# Patient Record
Sex: MALE | Race: WHITE | NOT HISPANIC OR LATINO
[De-identification: names, ages, dates, MRNs, and addresses within clinical notes are randomized per-mention and may not be internally consistent; named-entity substitution may affect disease eponyms.]

---

## 2017-01-10 ENCOUNTER — APPOINTMENT (OUTPATIENT)
Dept: SPINE | Facility: CLINIC | Age: 57
End: 2017-01-10

## 2017-01-18 ENCOUNTER — APPOINTMENT (OUTPATIENT)
Dept: SPINE | Facility: CLINIC | Age: 57
End: 2017-01-18

## 2017-01-18 VITALS
BODY MASS INDEX: 24.34 KG/M2 | DIASTOLIC BLOOD PRESSURE: 70 MMHG | HEIGHT: 70 IN | SYSTOLIC BLOOD PRESSURE: 120 MMHG | WEIGHT: 170 LBS

## 2017-01-18 DIAGNOSIS — M62.838 OTHER MUSCLE SPASM: ICD-10-CM

## 2017-01-18 DIAGNOSIS — M54.2 CERVICALGIA: ICD-10-CM

## 2017-01-18 DIAGNOSIS — M48.02 SPINAL STENOSIS, CERVICAL REGION: ICD-10-CM

## 2017-01-18 RX ORDER — METHOCARBAMOL 750 MG/1
750 TABLET, FILM COATED ORAL 3 TIMES DAILY
Qty: 60 | Refills: 0 | Status: ACTIVE | COMMUNITY
Start: 2017-01-18 | End: 1900-01-01

## 2017-02-01 ENCOUNTER — APPOINTMENT (OUTPATIENT)
Dept: SPINE | Facility: CLINIC | Age: 57
End: 2017-02-01

## 2017-02-01 VITALS — WEIGHT: 170 LBS | HEIGHT: 70 IN | BODY MASS INDEX: 24.34 KG/M2

## 2017-02-01 VITALS — SYSTOLIC BLOOD PRESSURE: 116 MMHG | DIASTOLIC BLOOD PRESSURE: 74 MMHG

## 2017-02-01 DIAGNOSIS — M48.00 SPINAL STENOSIS, SITE UNSPECIFIED: ICD-10-CM

## 2017-02-01 DIAGNOSIS — G95.89 OTHER SPECIFIED DISEASES OF SPINAL CORD: ICD-10-CM

## 2017-02-01 RX ORDER — OXYCODONE 10 MG/1
10 TABLET ORAL EVERY 8 HOURS
Qty: 90 | Refills: 0 | Status: ACTIVE | COMMUNITY
Start: 2017-01-18 | End: 1900-01-01

## 2021-03-12 ENCOUNTER — INPATIENT (INPATIENT)
Facility: HOSPITAL | Age: 61
LOS: 4 days | Discharge: NURSING, REHAB OR LONG-TERM CARE | End: 2021-03-17
Attending: STUDENT IN AN ORGANIZED HEALTH CARE EDUCATION/TRAINING PROGRAM | Admitting: STUDENT IN AN ORGANIZED HEALTH CARE EDUCATION/TRAINING PROGRAM
Payer: MEDICAID

## 2021-03-12 VITALS
OXYGEN SATURATION: 100 % | RESPIRATION RATE: 17 BRPM | TEMPERATURE: 98 F | DIASTOLIC BLOOD PRESSURE: 60 MMHG | SYSTOLIC BLOOD PRESSURE: 102 MMHG | HEART RATE: 66 BPM

## 2021-03-12 DIAGNOSIS — Z90.49 ACQUIRED ABSENCE OF OTHER SPECIFIED PARTS OF DIGESTIVE TRACT: Chronic | ICD-10-CM

## 2021-03-12 DIAGNOSIS — Z98.890 OTHER SPECIFIED POSTPROCEDURAL STATES: Chronic | ICD-10-CM

## 2021-03-12 DIAGNOSIS — Z93.3 COLOSTOMY STATUS: Chronic | ICD-10-CM

## 2021-03-12 DIAGNOSIS — R45.1 RESTLESSNESS AND AGITATION: ICD-10-CM

## 2021-03-12 LAB
ALBUMIN SERPL ELPH-MCNC: 3.7 G/DL — SIGNIFICANT CHANGE UP (ref 3.3–5)
ALP SERPL-CCNC: 96 U/L — SIGNIFICANT CHANGE UP (ref 40–120)
ALT FLD-CCNC: 14 U/L — SIGNIFICANT CHANGE UP (ref 4–41)
ANION GAP SERPL CALC-SCNC: 8 MMOL/L — SIGNIFICANT CHANGE UP (ref 7–14)
APPEARANCE UR: ABNORMAL
AST SERPL-CCNC: 20 U/L — SIGNIFICANT CHANGE UP (ref 4–40)
BACTERIA # UR AUTO: NEGATIVE — SIGNIFICANT CHANGE UP
BASOPHILS # BLD AUTO: 0 K/UL — SIGNIFICANT CHANGE UP (ref 0–0.2)
BASOPHILS NFR BLD AUTO: 0 % — SIGNIFICANT CHANGE UP (ref 0–2)
BILIRUB SERPL-MCNC: <0.2 MG/DL — SIGNIFICANT CHANGE UP (ref 0.2–1.2)
BILIRUB UR-MCNC: NEGATIVE — SIGNIFICANT CHANGE UP
BUN SERPL-MCNC: 18 MG/DL — SIGNIFICANT CHANGE UP (ref 7–23)
CALCIUM SERPL-MCNC: 9.6 MG/DL — SIGNIFICANT CHANGE UP (ref 8.4–10.5)
CHLORIDE SERPL-SCNC: 102 MMOL/L — SIGNIFICANT CHANGE UP (ref 98–107)
CO2 SERPL-SCNC: 27 MMOL/L — SIGNIFICANT CHANGE UP (ref 22–31)
COLOR SPEC: SIGNIFICANT CHANGE UP
CREAT SERPL-MCNC: 0.67 MG/DL — SIGNIFICANT CHANGE UP (ref 0.5–1.3)
DIFF PNL FLD: ABNORMAL
EOSINOPHIL # BLD AUTO: 0.57 K/UL — HIGH (ref 0–0.5)
EOSINOPHIL NFR BLD AUTO: 6 % — SIGNIFICANT CHANGE UP (ref 0–6)
EPI CELLS # UR: 0 /HPF — SIGNIFICANT CHANGE UP (ref 0–5)
GLUCOSE BLDC GLUCOMTR-MCNC: 85 MG/DL — SIGNIFICANT CHANGE UP (ref 70–99)
GLUCOSE SERPL-MCNC: 110 MG/DL — HIGH (ref 70–99)
GLUCOSE UR QL: NEGATIVE — SIGNIFICANT CHANGE UP
HCT VFR BLD CALC: 40.2 % — SIGNIFICANT CHANGE UP (ref 39–50)
HGB BLD-MCNC: 12.8 G/DL — LOW (ref 13–17)
HYALINE CASTS # UR AUTO: 0 /LPF — SIGNIFICANT CHANGE UP (ref 0–7)
IANC: 6.62 K/UL — SIGNIFICANT CHANGE UP (ref 1.5–8.5)
KETONES UR-MCNC: NEGATIVE — SIGNIFICANT CHANGE UP
LEUKOCYTE ESTERASE UR-ACNC: NEGATIVE — SIGNIFICANT CHANGE UP
LYMPHOCYTES # BLD AUTO: 1.73 K/UL — SIGNIFICANT CHANGE UP (ref 1–3.3)
LYMPHOCYTES # BLD AUTO: 18.1 % — SIGNIFICANT CHANGE UP (ref 13–44)
MCHC RBC-ENTMCNC: 29.8 PG — SIGNIFICANT CHANGE UP (ref 27–34)
MCHC RBC-ENTMCNC: 31.8 GM/DL — LOW (ref 32–36)
MCV RBC AUTO: 93.5 FL — SIGNIFICANT CHANGE UP (ref 80–100)
MONOCYTES # BLD AUTO: 0.66 K/UL — SIGNIFICANT CHANGE UP (ref 0–0.9)
MONOCYTES NFR BLD AUTO: 6.9 % — SIGNIFICANT CHANGE UP (ref 2–14)
NEUTROPHILS # BLD AUTO: 6.09 K/UL — SIGNIFICANT CHANGE UP (ref 1.8–7.4)
NEUTROPHILS NFR BLD AUTO: 63.8 % — SIGNIFICANT CHANGE UP (ref 43–77)
NITRITE UR-MCNC: NEGATIVE — SIGNIFICANT CHANGE UP
PH UR: 8 — SIGNIFICANT CHANGE UP (ref 5–8)
PLATELET # BLD AUTO: 180 K/UL — SIGNIFICANT CHANGE UP (ref 150–400)
POTASSIUM SERPL-MCNC: 4.1 MMOL/L — SIGNIFICANT CHANGE UP (ref 3.5–5.3)
POTASSIUM SERPL-SCNC: 4.1 MMOL/L — SIGNIFICANT CHANGE UP (ref 3.5–5.3)
PROT SERPL-MCNC: 7.6 G/DL — SIGNIFICANT CHANGE UP (ref 6–8.3)
PROT UR-MCNC: ABNORMAL
RBC # BLD: 4.3 M/UL — SIGNIFICANT CHANGE UP (ref 4.2–5.8)
RBC # FLD: 15.3 % — HIGH (ref 10.3–14.5)
RBC CASTS # UR COMP ASSIST: 11 /HPF — HIGH (ref 0–4)
SARS-COV-2 RNA SPEC QL NAA+PROBE: SIGNIFICANT CHANGE UP
SODIUM SERPL-SCNC: 137 MMOL/L — SIGNIFICANT CHANGE UP (ref 135–145)
SP GR SPEC: 1.02 — SIGNIFICANT CHANGE UP (ref 1.01–1.02)
UROBILINOGEN FLD QL: SIGNIFICANT CHANGE UP
WBC # BLD: 9.55 K/UL — SIGNIFICANT CHANGE UP (ref 3.8–10.5)
WBC # FLD AUTO: 9.55 K/UL — SIGNIFICANT CHANGE UP (ref 3.8–10.5)
WBC UR QL: 1 /HPF — SIGNIFICANT CHANGE UP (ref 0–5)

## 2021-03-12 PROCEDURE — 99285 EMERGENCY DEPT VISIT HI MDM: CPT

## 2021-03-12 PROCEDURE — 71045 X-RAY EXAM CHEST 1 VIEW: CPT | Mod: 26

## 2021-03-12 RX ORDER — MIDAZOLAM HYDROCHLORIDE 1 MG/ML
2 INJECTION, SOLUTION INTRAMUSCULAR; INTRAVENOUS ONCE
Refills: 0 | Status: DISCONTINUED | OUTPATIENT
Start: 2021-03-12 | End: 2021-03-12

## 2021-03-12 RX ORDER — MIDAZOLAM HYDROCHLORIDE 1 MG/ML
4 INJECTION, SOLUTION INTRAMUSCULAR; INTRAVENOUS ONCE
Refills: 0 | Status: DISCONTINUED | OUTPATIENT
Start: 2021-03-12 | End: 2021-03-12

## 2021-03-12 RX ORDER — OLANZAPINE 15 MG/1
5 TABLET, FILM COATED ORAL ONCE
Refills: 0 | Status: COMPLETED | OUTPATIENT
Start: 2021-03-12 | End: 2021-03-12

## 2021-03-12 RX ADMIN — MIDAZOLAM HYDROCHLORIDE 2 MILLIGRAM(S): 1 INJECTION, SOLUTION INTRAMUSCULAR; INTRAVENOUS at 18:09

## 2021-03-12 RX ADMIN — OLANZAPINE 5 MILLIGRAM(S): 15 TABLET, FILM COATED ORAL at 17:26

## 2021-03-12 NOTE — H&P ADULT - REASON FOR ADMISSION
[General Appearance - Well Developed] : well developed [General Appearance - Well Nourished] : well nourished [Normal Appearance] : normal appearance [Well Groomed] : well groomed [General Appearance - In No Acute Distress] : no acute distress [Abdomen Soft] : soft Persistent aggression [Abdomen Tenderness] : non-tender [Costovertebral Angle Tenderness] : no ~M costovertebral angle tenderness [Urinary Bladder Findings] : the bladder was normal on palpation [Edema] : no peripheral edema [] : no respiratory distress [Respiration, Rhythm And Depth] : normal respiratory rhythm and effort [Exaggerated Use Of Accessory Muscles For Inspiration] : no accessory muscle use [Oriented To Time, Place, And Person] : oriented to person, place, and time [Affect] : the affect was normal [Mood] : the mood was normal [Not Anxious] : not anxious [Normal Station and Gait] : the gait and station were normal for the patient's age [No Focal Deficits] : no focal deficits [No Palpable Adenopathy] : no palpable adenopathy Persistent aggression, Agitation

## 2021-03-12 NOTE — H&P ADULT - ATTENDING COMMENTS
61 year old male with Vascular dementia, Psychosis, Type-2 DM (Lantus 5 units), Major depressive disorder, and CAD - s/p Stents sent to the ED secondary to aggression.  Given versed and Zyprexa in the ED, with some improvement in condition.  Will need Psychiatry consult in the AM to evaluate current medication regimen; has had medication changes, and may be suboptimal at present.  F/U AM lab-work.  If becomes disruptive, would place on constant observation.  FU AM lab-work.  May need Social Work consult re placement.    All other management as prescribed. 61 year old male with Vascular dementia, Psychosis, Type-2 DM (Lantus 5 units), Major depressive disorder, and CAD - s/p Stents sent to the ED secondary to aggression.  Given versed and Zyprexa in the ED, with some improvement in condition.  Will need Psychiatry consult in the AM to evaluate current medication regimen; has had medication changes, and may be suboptimal at present.  Continuing PTA meds for now.  F/U AM lab-work.  If becomes disruptive, would place on constant observation.  FU AM lab-work.  Per documentation from transferring facility, patient intended for [facility].  Social Work consult re placement.    All other management as prescribed.

## 2021-03-12 NOTE — ED ADULT TRIAGE NOTE - CHIEF COMPLAINT QUOTE
Pt brought in from [facility] for aggression towards staff. Pt brought in from [facility] for aggression towards staff. Pt will not keep mask on Pt brought in from [facility] for aggression towards staff. Pt has hx of aggression towards staff. Pt will not keep mask on

## 2021-03-12 NOTE — ED PROVIDER NOTE - PHYSICAL EXAMINATION
Attending/Lilo: Well-appearing, NAD; PERRL/EOMI, non-icterus, supple, no JOSEMANUEL, no JVD, RRR, CTAB; Abd-soft, NT/ND, no HSM; no LE edema, A&Ox3, nonfocal; Skin-warm/dry

## 2021-03-12 NOTE — ED PROVIDER NOTE - OBJECTIVE STATEMENT
Attending/Lilo: 62 yo M NHR h/o DM, vascular dementia, CAD, psychosis, major depressive d/o referred by NH for aggressive behavior. As per the nursing supervisor, Aniya Burns, while staff assist patient with ADLs pt has been exhibiting aggressive behavior biting, pushing staff, bending staff hand/fingers. Pt was seen and evaluated at [facility] earlier in the week for this behavior and send back to the NH. Pt's supervisor stated to be "concern about staff safety." Pt is not able to provide history.

## 2021-03-12 NOTE — ED PROVIDER NOTE - PMH
Major depressive disorder, remission status unspecified, unspecified whether recurrent    Type 2 diabetes mellitus without complication, unspecified whether long term insulin use    Vascular dementia with behavior disturbance

## 2021-03-12 NOTE — H&P ADULT - PROBLEM SELECTOR PLAN 5
- per MOLST: Comfort measures only.  DNR, DNI  - No feeding tube.  No IVF  - Use antibiotics  - Do not sent to Hospital unless pain or severe symptoms cannot be otherwise controlled

## 2021-03-12 NOTE — H&P ADULT - NSHPPHYSICALEXAM_GEN_ALL_CORE
Vital Signs Last 24 Hrs  T(C): 36.4 (12 Mar 2021 18:42), Max: 36.7 (12 Mar 2021 12:47)  T(F): 97.5 (12 Mar 2021 18:42), Max: 98 (12 Mar 2021 12:47)  HR: 58 (12 Mar 2021 18:42) (58 - 66)  BP: 96/57 (12 Mar 2021 18:42) (96/57 - 102/60)  BP(mean): --  ABP: --  ABP(mean): --  RR: 16 (12 Mar 2021 18:42) (16 - 17)  SpO2: 100% (12 Mar 2021 18:42) (100% - 100%)     PHYSICAL EXAM: INCOMPLETE PHYSICAL EXAM     General: NAD  HEENT: NC/AT; PERRL, clear conjunctiva  Neck: supple  Respiratory: CTA b/l  Cardiovascular: +S1/S2; RRR  Abdomen: soft, NT/ND; +BS x4  Extremities: WWP, 2+ peripheral pulses b/l; no LE edema  Skin: normal color and turgor; no rash  Neurological: Vital Signs Last 24 Hrs  T(C): 36.4 (12 Mar 2021 18:42), Max: 36.7 (12 Mar 2021 12:47)  T(F): 97.5 (12 Mar 2021 18:42), Max: 98 (12 Mar 2021 12:47)  HR: 58 (12 Mar 2021 18:42) (58 - 66)  BP: 96/57 (12 Mar 2021 18:42) (96/57 - 102/60)  BP(mean): --  ABP: --  ABP(mean): --  RR: 16 (12 Mar 2021 18:42) (16 - 17)  SpO2: 100% (12 Mar 2021 18:42) (100% - 100%)       PHYSICAL EXAM: INCOMPLETE PHYSICAL EXAM     General: NAD  HEENT: NC/AT; PERRL, clear conjunctiva  Neck: supple  Respiratory: CTA b/l  Cardiovascular: +S1/S2; RRR  Abdomen: soft, NT/ND; +BS x4  Extremities: WWP, 2+ peripheral pulses b/l; no LE edema  Skin: normal color and turgor; no rash  Neurological: Vital Signs Last 24 Hrs  T(C): 36.4 (12 Mar 2021 18:42), Max: 36.7 (12 Mar 2021 12:47)  T(F): 97.5 (12 Mar 2021 18:42), Max: 98 (12 Mar 2021 12:47)  HR: 58 (12 Mar 2021 18:42) (58 - 66)  BP: 96/57 (12 Mar 2021 18:42) (96/57 - 102/60)  BP(mean): --  ABP: --  ABP(mean): --  RR: 16 (12 Mar 2021 18:42) (16 - 17)  SpO2: 100% (12 Mar 2021 18:42) (100% - 100%)       PHYSICAL EXAM:     General: thin, NAD, lethargic, appears comfortable on stretcher  HEENT: NC/AT; PERRL, clear conjunctiva  Neck: supple  Respiratory: CTA b/l  Cardiovascular: bradycardic, soft heart sounds, no murmurs appreciated  Abdomen: soft, sunken abdomen, NT/ND; +BS x4  Extremities: WWP, 2+ peripheral pulses b/l; no LE edema  Skin: normal color and turgor; no rash  Neurological: lethargic (s/p midazolam), oriented to name, and location, but not year, not POTUS. EOMI, PERRL. Good strength throughout. Vital Signs Last 24 Hrs  T(C): 36.4 (12 Mar 2021 18:42), Max: 36.7 (12 Mar 2021 12:47)  T(F): 97.5 (12 Mar 2021 18:42), Max: 98 (12 Mar 2021 12:47)  HR: 58 (12 Mar 2021 18:42) (58 - 66)  BP: 96/57 (12 Mar 2021 18:42) (96/57 - 102/60)  BP(mean): --  ABP: --  ABP(mean): --  RR: 16 (12 Mar 2021 18:42) (16 - 17)  SpO2: 100% (12 Mar 2021 18:42) (100% - 100%)       PHYSICAL EXAM:     General: thin, NAD, lethargic, appears comfortable on stretcher  HEENT: NC/AT; PERRL, clear conjunctiva, oral dyskinesia  Neck: supple  Respiratory: CTA b/l  Cardiovascular: bradycardic, soft heart sounds, no murmurs appreciated  Abdomen: soft, sunken abdomen, NT/ND; +BS x4  Extremities: WWP, 2+ peripheral pulses b/l; no LE edema  Skin: normal color and turgor; no rash  Neurological: lethargic (s/p midazolam), oriented to name, and location, but not year, not POTUS. EOMI, PERRL. Good strength throughout. Vital Signs Last 24 Hrs  T(C): 36.4 (12 Mar 2021 18:42), Max: 36.7 (12 Mar 2021 12:47)  T(F): 97.5 (12 Mar 2021 18:42), Max: 98 (12 Mar 2021 12:47)  HR: 58 (12 Mar 2021 18:42) (58 - 66)  BP: 96/57 (12 Mar 2021 18:42) (96/57 - 102/60)  BP(mean): --  ABP: --  ABP(mean): --  RR: 16 (12 Mar 2021 18:42) (16 - 17)  SpO2: 100% (12 Mar 2021 18:42) (100% - 100%)       PHYSICAL EXAM:     General: thin, NAD, lethargic, appears comfortable on stretcher  HEENT: NC/AT; PERRL, clear conjunctivae, oral dyskinesia  Neck: supple  Respiratory: CTA b/l  Cardiovascular: bradycardic, soft heart sounds, no murmurs appreciated  Abdomen: soft, sunken abdomen, NT/ND; +BS x4  Extremities: WWP, 2+ peripheral pulses b/l; no LE edema  Skin: normal color and turgor; no rash  Neurological: lethargic (s/p midazolam), oriented to name, and location, but not year, not POTUS. EOMI, PERRL. Good strength throughout.

## 2021-03-12 NOTE — ED PROVIDER NOTE - CLINICAL SUMMARY MEDICAL DECISION MAKING FREE TEXT BOX
A/P 60 yo M with vascular dementia, psychosis, major depressive d/o referred from NH with increasing aggressive behavior  -HCT, labs, pysch

## 2021-03-12 NOTE — ED PROVIDER NOTE - PROGRESS NOTE DETAILS
Dominic VALENTINO MD PGY3: Patient required 5 of zyprexa for COVID swab. Not cooperating with straight cath. WIll give 4 of midazolam and proceed with straight cath.

## 2021-03-12 NOTE — H&P ADULT - NSHPLABSRESULTS_GEN_ALL_CORE
LABS:                        12.8   9.55  )-----------( 180      ( 12 Mar 2021 15:16 )             40.2     Hemoglobin: 12.8 g/dL ( @ 15:16)    CBC Full  -  ( 12 Mar 2021 15:16 )  WBC Count : 9.55 K/uL  RBC Count : 4.30 M/uL  Hemoglobin : 12.8 g/dL  Hematocrit : 40.2 %  Platelet Count - Automated : 180 K/uL  Mean Cell Volume : 93.5 fL  Mean Cell Hemoglobin : 29.8 pg  Mean Cell Hemoglobin Concentration : 31.8 gm/dL  Auto Neutrophil # : 6.09 K/uL  Auto Lymphocyte # : 1.73 K/uL  Auto Monocyte # : 0.66 K/uL  Auto Eosinophil # : 0.57 K/uL  Auto Basophil # : 0.00 K/uL  Auto Neutrophil % : 63.8 %  Auto Lymphocyte % : 18.1 %  Auto Monocyte % : 6.9 %  Auto Eosinophil % : 6.0 %  Auto Basophil % : 0.0 %        137  |  102  |  18  ----------------------------<  110<H>  4.1   |  27  |  0.67    Ca    9.6      12 Mar 2021 15:16    TPro  7.6  /  Alb  3.7  /  TBili  <0.2  /  DBili  x   /  AST  20  /  ALT  14  /  AlkPhos  96      Creatinine Trend: 0.67<--  LIVER FUNCTIONS - ( 12 Mar 2021 15:16 )  Alb: 3.7 g/dL / Pro: 7.6 g/dL / ALK PHOS: 96 U/L / ALT: 14 U/L / AST: 20 U/L / GGT: x            Urinalysis Basic - ( 12 Mar 2021 18:54 )    Color: Light Yellow / Appearance: Slightly Turbid / S.020 / pH: x  Gluc: x / Ketone: Negative  / Bili: Negative / Urobili: <2 mg/dL   Blood: x / Protein: Trace / Nitrite: Negative   Leuk Esterase: Negative / RBC: 11 /HPF / WBC 1 /HPF   Sq Epi: x / Non Sq Epi: 0 /HPF / Bacteria: Negative    IMAGING:    X-ray Chest 1 View- PORTABLE-Urgent (21 @ 16:52)  IMPRESSION:  Clear lungs. No pleural effusions or pneumothorax.  Aortic arch calcifications. Otherwise cardiac and mediastinal silhouettes within normal limits.  Trachea midline.  Intact and unremarkable appearing multilevel posterior cervical fusion hardware. Unremarkable remaining which was osseous structures.

## 2021-03-12 NOTE — H&P ADULT - PROBLEM SELECTOR PLAN 1
At the time of H&P, the history gathered seems to suggest patient was on aripiprazole (which he had been taking for over a year), was changed to quetiapine without benztropine, however patient did not tolerate as he seemed to have developed extrapyramidal side effects from antipsychotic treatment. Subsequently patient's antipsychotics discontinued, and at the same time 10/2020, lorazepam was started. Eventually, 2/2021, patient started on valproic acid as well. Given that there has been no acute change in psychiatric symptoms, patient's presentation is less concerning for infectious etiology. Patient's chronic symptoms would likely be best addressed with more optimal psych/neuro medication regimen.    -consider psychiatric consult  -***  -*** At the time of H&P, the history gathered seems to suggest patient was on aripiprazole (which he had been taking for over a year), was changed to quetiapine without benztropine, however patient did not tolerate as he seemed to have developed extrapyramidal side effects from antipsychotic treatment. Subsequently patient's antipsychotics discontinued, and at the same time 10/2020, lorazepam was started. Eventually, 2/2021, patient started on valproic acid as well. Given that there has been no acute change in psychiatric symptoms, patient's presentation is less concerning for infectious etiology. Patient's chronic symptoms would likely be best addressed with more optimal psych/neuro medication regimen.    -psychiatric consult  -f/u TSH, folate, B12 Report of aggressin and agitation  In the setting of vascular dementia, depression  At the time of H&P, the history gathered seems to suggest patient was on aripiprazole (which he had been taking for over a year), was changed to quetiapine without benztropine, however patient did not tolerate as he seemed to have developed extrapyramidal side effects from antipsychotic treatment. Subsequently patient's antipsychotics discontinued, and at the same time 10/2020, lorazepam was started. Eventually, 2/2021, patient started on valproic acid as well. Given that there has been no acute change in psychiatric symptoms, patient's presentation is less concerning for infectious etiology. Patient's chronic symptoms would likely be best addressed with more optimal psych/neuro medication regimen.  Continuing PTA meds for now    -psychiatric consult for evaluation/modification of therapy  -f/u TSH, folate, B12.  Already on Vit B1 Report of aggressin and agitation  In the setting of vascular dementia, depression  At the time of H&P, the history gathered seems to suggest patient was on aripiprazole (which he had been taking for over a year), was changed to quetiapine without benztropine, however patient did not tolerate as he seemed to have developed extrapyramidal side effects from antipsychotic treatment. Subsequently patient's antipsychotics discontinued, and at the same time 10/2020, lorazepam was started. Eventually, 2/2021, patient started on valproic acid as well. Given that there has been no acute change in psychiatric symptoms, patient's presentation is less concerning for infectious etiology. Patient's chronic symptoms would likely be best addressed with more optimal psych/neuro medication regimen.  Continuing PTA meds for now    -psychiatric consult for evaluation/modification of therapy  -per documentation from transferring facility, patient was intended for transfer to [facility]  -f/u TSH, folate, B12.  Already on Vit B1

## 2021-03-12 NOTE — H&P ADULT - PROBLEM SELECTOR PLAN 3
NH reports 5 Lantus qHS. Blood glucose here wnl thus far, most recent 85. Will c/w monitoring off of meds. NH reports 5 Lantus qHS. Blood glucose here wnl thus far, most recent 85. Will c/w monitoring off of meds.  -C/C diet  -FS before meals and at bedtime  -f/u A1c

## 2021-03-12 NOTE — H&P ADULT - ASSESSMENT
61M PMHx BPD, MDD, psychosis, vascular dementia presenting from NH for aggressive behavior that seemed to worsen 10/2020, and since then has been persistent and unchanged.

## 2021-03-12 NOTE — H&P ADULT - NSHPSOURCEINFORD_GEN_ALL_CORE
Chart(s) Chart(s)/Other Healthcare Facility Chart(s)/Spouse/Significant Other/Other Healthcare Facility

## 2021-03-12 NOTE — H&P ADULT - HISTORY OF PRESENT ILLNESS
DRAFT ///  "60 yo M NHR h/o DM, vascular dementia, CAD, psychosis, major depressive d/o referred by NH for aggressive behavior. As per the nursing supervisor, Aniya Burns, while staff assist patient with ADLs pt has been exhibiting aggressive behavior biting, pushing staff, bending staff hand/fingers. Pt was seen and evaluated at [facility] earlier in the week for this behavior and send back to the NH. Pt's supervisor stated to be "concern about staff safety." Pt is not able to provide history."    "Progress: Dominic VALENTINO MD PGY3: Patient required 5 of zyprexa for COVID swab. Not cooperating with straight cath. WIll give 4 of midazolam and proceed with straight cath."    "A/P 60 yo M with vascular dementia, psychosis, major depressive d/o referred from NH with increasing aggressive behavior  -HCT, labs, pysch"    "pt brought by family for confusion, not fallowing instructions, pt not able to give details, anxious, labs sent, IV to right AC 20g angio cath place.  COVID sent"    "Pt brought in from [facility] for aggression towards staff. Pt has hx of aggression towards staff."   DRAFT   ///  61M PMHx bipolar d/o, major depressive d/o, psychosis, vascular dementia, CAD (s/p PCI), DM2, referred by [facility] for aggressive behavior. As per nursing supervisor, Aniya Bruns, while staff assisting patient with ADLs, patient has been exhibiting aggressive biting, pushing staff, bending staff hands/fingers. Patient was seen and evaluated at [facility] earlier in the week for this behavior and was sent back to the NH. Patient's supervisor stated that she was concerned about staff safety.     In the ED, patient required olanzapine 5mg for COVID swab. Patient was not cooperating with straight catheterization, and was subsequently given midazolam 4mg to proceed w/ catheterization.    \\\  "62 yo M NHR h/o DM, vascular dementia, CAD, psychosis, major depressive d/o referred by NH for aggressive behavior. As per the nursing supervisor, Aniya Burns, while staff assist patient with ADLs pt has been exhibiting aggressive behavior biting, pushing staff, bending staff hand/fingers. Pt was seen and evaluated at [facility] earlier in the week for this behavior and send back to the NH. Pt's supervisor stated to be "concern about staff safety." Pt is not able to provide history."    "Progress: Dominic VALENTINO MD PGY3: Patient required 5 of zyprexa for COVID swab. Not cooperating with straight cath. WIll give 4 of midazolam and proceed with straight cath."    "A/P 62 yo M with vascular dementia, psychosis, major depressive d/o referred from NH with increasing aggressive behavior  -HCT, labs, pysch"    "pt brought by family for confusion, not fallowing instructions, pt not able to give details, anxious, labs sent, IV to right AC 20g angio cath place.  COVID sent"    "Pt brought in from [facility] for aggression towards staff. Pt has hx of aggression towards staff."    ________________  PAST ADMISSION: "55yo m pmh bipolar disorder, CAD s/p PCI, DM2 (a1c 6.7), PAD, complicated diverticulitis with colostomy admitted 12/12/16 for elective C3-C7 laminectomy with C2-T2 fusion" 61M PMHx bipolar d/o, major depressive d/o, psychosis, vascular dementia, CAD (s/p PCI), DM2, referred by [facility] for aggressive behavior. As per nursing supervisor, Aniya Burns, while staff assisting patient with ADLs, patient has been exhibiting aggressive biting, pushing staff, bending staff hands/fingers. Patient was seen and evaluated at [facility] earlier in the week for this behavior and was sent back to the NH. The nursing supervisor stated that she was concerned about staff safety.     Per discussion with nursing staff at [facility], patient arrived to NH about 2 years ago, he was on Abilify, and relatively calm. Abilify and Cogentin was discontinued 10/2020 -- reason unclear during this conversation w/ nursing staff. He was also given Seroquel was for about a week in 10/2020. However, patient subsequently with protruding tongue and leaning to the ?left, therefore Seroquel changed to Ativan, per nursing staff. Ativan was started at 0.25mg in 10/2020, later increased to 0.5mg BID, continuing to present day. Patient's Trazadone was decreased from 100mg to 50mg in 10/2020. In 2/2021, patient was started on valproic acid 5mL BID.    Per nursing staff, everything started going "downhill" around this time in 10/2020, regarding his aggression and overall behavior. Nursing staff reports that patient's behavior has been persistent for months now, with no changes in aggression or other psychiatric symptoms.     Of note, patient has history of urinary and bowel incontinence. Patient's aggression is worsened when changing and cleaning patient after these episodes of incontinence.    In the ED, patient required olanzapine 5mg for COVID swab (negative). Patient was not cooperating with straight catheterization, and was subsequently given midazolam 4mg to proceed w/ catheterization. 61M PMHx bipolar d/o, major depressive d/o, psychosis, vascular dementia, CAD (s/p PCI), DM2, referred by [facility] for aggressive behavior. As per nursing supervisor, Aniya Burns, while staff assisting patient with ADLs, patient has been exhibiting aggressive biting, pushing staff, bending staff hands/fingers. Patient was seen and evaluated at [facility] earlier in the week for this behavior and was sent back to the NH. The nursing supervisor stated that she was concerned about staff safety.     Per discussion with nursing staff at [facility], patient arrived to NH about 2 years ago, he was on Abilify, and relatively calm. Abilify and Cogentin was discontinued 10/2020 -- reason unclear during this conversation w/ nursing staff. He was also given Seroquel was for about a week in 10/2020. However, patient subsequently with protruding tongue and leaning to the ?left, therefore Seroquel changed to Ativan, per nursing staff. Ativan was started at 0.25mg in 10/2020, later increased to 0.5mg BID, continuing to present day. Patient's Trazadone was decreased from 100mg to 50mg in 10/2020. In 2/2021, patient was started on valproic acid 5mL BID.    Per nursing staff, everything started going "downhill" around this time in 10/2020, regarding his aggression and overall behavior. Nursing staff reports that patient's behavior has been persistent for months now, with no changes in aggression or other psychiatric symptoms.     Of note, patient has history of urinary and bowel incontinence. Patient's aggression is worsened when changing and cleaning patient after these episodes of incontinence.     In the ED, patient required olanzapine 5mg for COVID swab (negative). Patient was not cooperating with straight catheterization, and was subsequently given midazolam 4mg to proceed w/ catheterization.

## 2021-03-12 NOTE — ED ADULT NURSE NOTE - OBJECTIVE STATEMENT
pt brought by family for confusion, not fallowing instructions, pt not able to give details, anxious, labs sent, IV to right AC 20g angio cath place.  COVID sent.   Pending dispo.       Paulette Mills RN

## 2021-03-12 NOTE — H&P ADULT - NSHPREVIEWOFSYSTEMS_GEN_ALL_CORE
INCOMPLETE ROS   CONSTITUTIONAL: No weakness, fevers or chills  EYES/ENT: No visual changes;  No vertigo or throat pain   NECK: No pain or stiffness  RESPIRATORY: No cough, wheezing, hemoptysis; No shortness of breath  CARDIOVASCULAR: No chest pain or palpitations  GASTROINTESTINAL: No abdominal or epigastric pain. No nausea, vomiting, or hematemesis; No diarrhea or constipation. No melena or hematochezia.  GENITOURINARY: No dysuria, frequency or hematuria  NEUROLOGICAL: No numbness or weakness  SKIN: No itching, rashes ROS limited 2/2 patient's dementia / sedation by midazolam in the ED. Patient denied ROS below.    CONSTITUTIONAL: No weakness, fevers  EYES/ENT: No throat pain   NECK: No pain  RESPIRATORY: No cough, No shortness of breath  CARDIOVASCULAR: No chest pain    GASTROINTESTINAL: No abdominal or epigastric pain. No nausea, vomiting; No diarrhea or constipation.   GENITOURINARY: No dysuria

## 2021-03-12 NOTE — H&P ADULT - NSICDXPASTMEDICALHX_GEN_ALL_CORE_FT
PAST MEDICAL HISTORY:  Anxiety     Bipolar disorder     Cervical spinal stenosis     Coronary artery disease s/p PCI    Hyperlipidemia     Insomnia     Major depressive disorder, remission status unspecified, unspecified whether recurrent     Peripheral vascular disease     Type 2 diabetes mellitus without complication, unspecified whether long term insulin use     Vascular dementia with behavior disturbance      PAST MEDICAL HISTORY:  Anxiety     Bipolar disorder     Bowel incontinence     Cervical spinal stenosis     Coronary artery disease s/p PCI    Hyperlipidemia     Insomnia     Major depressive disorder, remission status unspecified, unspecified whether recurrent     Peripheral vascular disease     Type 2 diabetes mellitus without complication, unspecified whether long term insulin use     Urinary incontinence     Vascular dementia with behavior disturbance

## 2021-03-12 NOTE — H&P ADULT - NSHPSOCIALHISTORY_GEN_ALL_CORE
Living situation: nursing home  Marital status:  (ex-wife Beronica Holguin)  EtOH: EtOH abuse history  Drug use: Drug abuse history (unclear which substances)

## 2021-03-12 NOTE — ED ADULT NURSE REASSESSMENT NOTE - NS ED NURSE REASSESS COMMENT FT1
2030 Report received from day RN - pt resting in stretcher, fidgeting otherwise stable, awaiting bed assignment

## 2021-03-13 DIAGNOSIS — E11.9 TYPE 2 DIABETES MELLITUS WITHOUT COMPLICATIONS: ICD-10-CM

## 2021-03-13 DIAGNOSIS — Z00.00 ENCOUNTER FOR GENERAL ADULT MEDICAL EXAMINATION WITHOUT ABNORMAL FINDINGS: ICD-10-CM

## 2021-03-13 DIAGNOSIS — I25.10 ATHEROSCLEROTIC HEART DISEASE OF NATIVE CORONARY ARTERY WITHOUT ANGINA PECTORIS: ICD-10-CM

## 2021-03-13 DIAGNOSIS — F99 MENTAL DISORDER, NOT OTHERWISE SPECIFIED: ICD-10-CM

## 2021-03-13 DIAGNOSIS — Z66 DO NOT RESUSCITATE: ICD-10-CM

## 2021-03-13 LAB
A1C WITH ESTIMATED AVERAGE GLUCOSE RESULT: 5.2 % — SIGNIFICANT CHANGE UP (ref 4–5.6)
CULTURE RESULTS: NO GROWTH — SIGNIFICANT CHANGE UP
ESTIMATED AVERAGE GLUCOSE: 103 MG/DL — SIGNIFICANT CHANGE UP (ref 68–114)
FOLATE SERPL-MCNC: 13.3 NG/ML — SIGNIFICANT CHANGE UP (ref 3.1–17.5)
GLUCOSE BLDC GLUCOMTR-MCNC: 101 MG/DL — HIGH (ref 70–99)
GLUCOSE BLDC GLUCOMTR-MCNC: 110 MG/DL — HIGH (ref 70–99)
GLUCOSE BLDC GLUCOMTR-MCNC: 123 MG/DL — HIGH (ref 70–99)
GLUCOSE BLDC GLUCOMTR-MCNC: 90 MG/DL — SIGNIFICANT CHANGE UP (ref 70–99)
HCV AB S/CO SERPL IA: 0.08 S/CO — SIGNIFICANT CHANGE UP (ref 0–0.99)
HCV AB SERPL-IMP: SIGNIFICANT CHANGE UP
SPECIMEN SOURCE: SIGNIFICANT CHANGE UP
TSH SERPL-MCNC: 3.08 UIU/ML — SIGNIFICANT CHANGE UP (ref 0.27–4.2)
VALPROATE SERPL-MCNC: 5 UG/ML — LOW (ref 50–100)
VIT B12 SERPL-MCNC: 788 PG/ML — SIGNIFICANT CHANGE UP (ref 200–900)

## 2021-03-13 PROCEDURE — 99223 1ST HOSP IP/OBS HIGH 75: CPT | Mod: GC

## 2021-03-13 PROCEDURE — 12345: CPT | Mod: NC,GC

## 2021-03-13 RX ORDER — INSULIN LISPRO 100/ML
VIAL (ML) SUBCUTANEOUS AT BEDTIME
Refills: 0 | Status: DISCONTINUED | OUTPATIENT
Start: 2021-03-13 | End: 2021-03-17

## 2021-03-13 RX ORDER — ASPIRIN/CALCIUM CARB/MAGNESIUM 324 MG
81 TABLET ORAL DAILY
Refills: 0 | Status: DISCONTINUED | OUTPATIENT
Start: 2021-03-13 | End: 2021-03-17

## 2021-03-13 RX ORDER — DEXTROSE 50 % IN WATER 50 %
15 SYRINGE (ML) INTRAVENOUS ONCE
Refills: 0 | Status: DISCONTINUED | OUTPATIENT
Start: 2021-03-13 | End: 2021-03-17

## 2021-03-13 RX ORDER — VALPROIC ACID (AS SODIUM SALT) 250 MG/5ML
125 SOLUTION, ORAL ORAL ONCE
Refills: 0 | Status: DISCONTINUED | OUTPATIENT
Start: 2021-03-13 | End: 2021-03-13

## 2021-03-13 RX ORDER — DEXTROSE 50 % IN WATER 50 %
12.5 SYRINGE (ML) INTRAVENOUS ONCE
Refills: 0 | Status: DISCONTINUED | OUTPATIENT
Start: 2021-03-13 | End: 2021-03-17

## 2021-03-13 RX ORDER — TRAZODONE HCL 50 MG
50 TABLET ORAL AT BEDTIME
Refills: 0 | Status: DISCONTINUED | OUTPATIENT
Start: 2021-03-13 | End: 2021-03-17

## 2021-03-13 RX ORDER — SODIUM CHLORIDE 9 MG/ML
1000 INJECTION, SOLUTION INTRAVENOUS ONCE
Refills: 0 | Status: COMPLETED | OUTPATIENT
Start: 2021-03-13 | End: 2021-03-13

## 2021-03-13 RX ORDER — DEXTROSE 50 % IN WATER 50 %
25 SYRINGE (ML) INTRAVENOUS ONCE
Refills: 0 | Status: DISCONTINUED | OUTPATIENT
Start: 2021-03-13 | End: 2021-03-17

## 2021-03-13 RX ORDER — INSULIN LISPRO 100/ML
VIAL (ML) SUBCUTANEOUS
Refills: 0 | Status: DISCONTINUED | OUTPATIENT
Start: 2021-03-13 | End: 2021-03-17

## 2021-03-13 RX ORDER — THIAMINE MONONITRATE (VIT B1) 100 MG
100 TABLET ORAL DAILY
Refills: 0 | Status: DISCONTINUED | OUTPATIENT
Start: 2021-03-13 | End: 2021-03-17

## 2021-03-13 RX ORDER — OLANZAPINE 15 MG/1
2.5 TABLET, FILM COATED ORAL EVERY 4 HOURS
Refills: 0 | Status: DISCONTINUED | OUTPATIENT
Start: 2021-03-13 | End: 2021-03-14

## 2021-03-13 RX ORDER — VALPROIC ACID (AS SODIUM SALT) 250 MG/5ML
250 SOLUTION, ORAL ORAL
Refills: 0 | Status: DISCONTINUED | OUTPATIENT
Start: 2021-03-13 | End: 2021-03-13

## 2021-03-13 RX ORDER — SODIUM CHLORIDE 9 MG/ML
1000 INJECTION, SOLUTION INTRAVENOUS
Refills: 0 | Status: DISCONTINUED | OUTPATIENT
Start: 2021-03-13 | End: 2021-03-17

## 2021-03-13 RX ORDER — VALPROIC ACID (AS SODIUM SALT) 250 MG/5ML
250 SOLUTION, ORAL ORAL
Refills: 0 | Status: DISCONTINUED | OUTPATIENT
Start: 2021-03-13 | End: 2021-03-17

## 2021-03-13 RX ORDER — VALPROIC ACID (AS SODIUM SALT) 250 MG/5ML
125 SOLUTION, ORAL ORAL ONCE
Refills: 0 | Status: COMPLETED | OUTPATIENT
Start: 2021-03-13 | End: 2021-03-13

## 2021-03-13 RX ORDER — VALPROIC ACID (AS SODIUM SALT) 250 MG/5ML
375 SOLUTION, ORAL ORAL
Refills: 0 | Status: DISCONTINUED | OUTPATIENT
Start: 2021-03-14 | End: 2021-03-17

## 2021-03-13 RX ORDER — GLUCAGON INJECTION, SOLUTION 0.5 MG/.1ML
1 INJECTION, SOLUTION SUBCUTANEOUS ONCE
Refills: 0 | Status: DISCONTINUED | OUTPATIENT
Start: 2021-03-13 | End: 2021-03-17

## 2021-03-13 RX ADMIN — Medication 50 MILLIGRAM(S): at 21:54

## 2021-03-13 RX ADMIN — Medication 100 MILLIGRAM(S): at 12:53

## 2021-03-13 RX ADMIN — Medication 0.5 MILLIGRAM(S): at 18:58

## 2021-03-13 RX ADMIN — SODIUM CHLORIDE 500 MILLILITER(S): 9 INJECTION, SOLUTION INTRAVENOUS at 10:15

## 2021-03-13 RX ADMIN — Medication 250 MILLIGRAM(S): at 17:13

## 2021-03-13 RX ADMIN — Medication 81 MILLIGRAM(S): at 12:53

## 2021-03-13 RX ADMIN — Medication 250 MILLIGRAM(S): at 11:36

## 2021-03-13 RX ADMIN — Medication 125 MILLIGRAM(S): at 17:12

## 2021-03-13 NOTE — PROVIDER CONTACT NOTE (OTHER) - ACTION/TREATMENT ORDERED:
MD notified and assessed patient at bedside. no further intervention at this time. pt encouraged to drink more oral fluids. will continue to monitor

## 2021-03-13 NOTE — BH CONSULTATION LIAISON ASSESSMENT NOTE - SUMMARY
61M domiciled at NH w/ PMHx of CAD s/p PCI, DMII w/ PPHx of bipolar disorder, MDD, vascular dementia, psychosis, and episodes of aggressive behavior as per NH staff and has been worsening while assisting patient with ADLs. Behaviors such as aggressive biting, pushing staff, bending staff hands/fingers. Patient arrived to NH about 2 years ago previously on Abilify and Cogentin which were discontinued 10/2020 for unclear reasons. As per charts patient was given Seroquel was for about a week in 10/2020 and subsequently developed protruding tongue movements, so discontinued and Ativan started for mood control along with VPA now on Depakene syrup 375mg PO daily and 250mg PO at night. Since approximately 10/2020 behavior and aggression has worsened and becoming more persistent. Psychiatry consulted for agitation.    Patient w/ repetitive oral fasciculations, restless movements, and emotionally labile unable to provide verbal needs.  No overt signs of infection noted; TSH, B12, folate WNL; VPA level 5; LFTs and PLTs WNL.    PLAN:  - Observational status deferred to primary team  - Consider CTH to assess if any overt reversible changes  - C/w Depakene 375mg PO in AM and 250mg PO in the evening  - C/w Trazadone 50mg PO qHS, as well as, Ativan 0.5mg PO BID for now   - Obtain ammonia level for AM  (Ordered)  - For SEVERE aggression/agitation can use Zyprexa 1.25mg PO/IM q 6 hours PRN; Would avoid Haldol as more risk for EPS  - If Zyprexa is ineffective consider additional Ativan 0.5-1mg PO and try to avoid IV in the setting of cannot use Zyprexa IM within 3 hours of Ativan IV/IM  - Potential to increase VPA regimen for better behavioral control; Psychiatry to follow-up tomorrow; Potential to increase mood stabilization regimen. 61M domiciled at NH w/ PMHx of CAD s/p PCI, DMII w/ PPHx of bipolar disorder, MDD, vascular dementia, psychosis, and episodes of aggressive behavior as per NH staff and has been worsening while assisting patient with ADLs. Behaviors such as aggressive biting, pushing staff, bending staff hands/fingers. Patient arrived to NH about 2 years ago previously on Abilify and Cogentin which were discontinued 10/2020 for unclear reasons. As per charts patient was given Seroquel was for about a week in 10/2020 and subsequently developed protruding tongue movements, so discontinued and Ativan started for mood control along with VPA now on Depakene syrup 375mg PO daily and 250mg PO at night. Since approximately 10/2020 behavior and aggression has worsened and becoming more persistent. Psychiatry consulted for agitation.    Patient w/ repetitive oral fasciculations and upper/lower extremity movements, restlessness, and emotionally labile unable to provide verbal needs.   No overt signs of infection noted; TSH, B12, folate WNL; VPA level 5; LFTs and PLTs WNL.  Likely progressive dementia w/ behavioral disturbance vs medication-induced akathisias vs possible symptoms of pseudobulbar affect    PLAN:  - Observational status deferred to primary team  - Consider CTH to assess if any overt reversible changes  - C/w Depakene 375mg PO in AM and 250mg PO in the evening  - C/w Trazadone 50mg PO qHS, as well as, Ativan 0.5mg PO BID for now   - Obtain ammonia level for AM  (Ordered)  - For SEVERE aggression/agitation can use Zyprexa 1.25mg PO/IM q 6 hours PRN  - Would avoid Haldol as more risk for EPS or high doses of Zyprexa  - If Zyprexa is ineffective consider additional Ativan 0.5-1mg PO and try to avoid IV in the setting of cannot use Zyprexa IM within 3 hours of Ativan IV/IM  - Potential to increase VPA regimen for better behavioral control; Psychiatry to follow-up tomorrow; Potential to increase mood stabilization regimen.

## 2021-03-13 NOTE — BH CONSULTATION LIAISON ASSESSMENT NOTE - NSICDXPASTMEDICALHX_GEN_ALL_CORE_FT
PAST MEDICAL HISTORY:  Anxiety     Bipolar disorder     Bowel incontinence     Cervical spinal stenosis     Coronary artery disease s/p PCI    Hyperlipidemia     Insomnia     Major depressive disorder, remission status unspecified, unspecified whether recurrent     Peripheral vascular disease     Type 2 diabetes mellitus without complication, unspecified whether long term insulin use     Urinary incontinence     Vascular dementia with behavior disturbance

## 2021-03-13 NOTE — BH CONSULTATION LIAISON ASSESSMENT NOTE - NSBHCHARTREVIEWLAB_PSY_A_CORE FT
12.8   9.55  )-----------( 180      ( 12 Mar 2021 15:16 )             40.2   03-12    137  |  102  |  18  ----------------------------<  110<H>  4.1   |  27  |  0.67    Ca    9.6      12 Mar 2021 15:16    TPro  7.6  /  Alb  3.7  /  TBili  <0.2  /  DBili  x   /  AST  20  /  ALT  14  /  AlkPhos  96  03-12

## 2021-03-13 NOTE — BH CONSULTATION LIAISON ASSESSMENT NOTE - CURRENT MEDICATION
MEDICATIONS  (STANDING):  aspirin enteric coated 81 milliGRAM(s) Oral daily  dextrose 40% Gel 15 Gram(s) Oral once  dextrose 5%. 1000 milliLiter(s) (50 mL/Hr) IV Continuous <Continuous>  dextrose 5%. 1000 milliLiter(s) (100 mL/Hr) IV Continuous <Continuous>  dextrose 50% Injectable 25 Gram(s) IV Push once  dextrose 50% Injectable 12.5 Gram(s) IV Push once  dextrose 50% Injectable 25 Gram(s) IV Push once  glucagon  Injectable 1 milliGRAM(s) IntraMuscular once  insulin lispro (ADMELOG) corrective regimen sliding scale   SubCutaneous three times a day before meals  insulin lispro (ADMELOG) corrective regimen sliding scale   SubCutaneous at bedtime  LORazepam     Tablet 0.5 milliGRAM(s) Oral two times a day  thiamine 100 milliGRAM(s) Oral daily  traZODone 50 milliGRAM(s) Oral at bedtime  valproic  acid Syrup 250 milliGRAM(s) Oral <User Schedule>    MEDICATIONS  (PRN):  OLANZapine 2.5 milliGRAM(s) Oral every 4 hours PRN agitation  OLANZapine Injectable 2.5 milliGRAM(s) IntraMuscular every 4 hours PRN agitation

## 2021-03-13 NOTE — BH CONSULTATION LIAISON ASSESSMENT NOTE - RISK ASSESSMENT
Risk factors:  Protective factors: _________ Risk factors: Acute agitation, multiple psychiatric diagnoses, familial isolation, acute medical issues  Protective factors: On medications; Residential stability w/ proper care

## 2021-03-13 NOTE — BH CONSULTATION LIAISON ASSESSMENT NOTE - NSBHCHARTREVIEWVS_PSY_A_CORE FT
Vital Signs Last 24 Hrs  T(C): 36.7 (13 Mar 2021 14:09), Max: 37.1 (13 Mar 2021 12:48)  T(F): 98 (13 Mar 2021 14:09), Max: 98.7 (13 Mar 2021 12:48)  HR: 61 (13 Mar 2021 14:12) (50 - 64)  BP: 95/54 (13 Mar 2021 14:12) (90/55 - 113/54)  BP(mean): --  RR: 18 (13 Mar 2021 14:09) (16 - 20)  SpO2: 100% (13 Mar 2021 14:09) (100% - 100%)

## 2021-03-13 NOTE — BH CONSULTATION LIAISON ASSESSMENT NOTE - NSBHCONSFOLLOWNEEDS_PSY_ALL_CORE
No psychiatric contraindications to discharge Needs further psych safety assessment prior to discharge

## 2021-03-13 NOTE — PATIENT PROFILE ADULT - FALLEN IN THE PAST
Problem: KNOWLEDGE DEFICIT,EDUCATION,DISCHARGE PLAN  Goal: Knowledge - personal safety  Outcome: Completed Date Met:  08/06/17  1. What are the warning signs when you begin thinking suicide or when you feel very distressed? Become distressed, feeling overwhelmed,stressed and begin to cry. 2. What can you do by yourself to take your mind off of the problem? Use some of the helpful coping skills I have learned in group and outside of group. Think about my experience and everything it has taught me. Along with everything the amazing, helpful and dedicated staff at [address] [place] [address] has helped me learn. Sometimes I may not be able to exercise or listen to music where I am. So I will pray, take deep breaths and examine the situation and rationalize the decisions I make for the best.  3. If you are unable to deal with your distressed mood alone, contact trusted family members or friends. List several people in case your first choices are not available. Mom [phone], Ram[facility]) [phone], Nina Ahumada (Brother) [phone].  4. Contact local professionals or emergency services if you continue to have suicidal thoughts or serious distress. 911, school counselor.       SUICIDE PREVENTION [place] PHONE NUMBERS:                                                                          [phone])                                                                          [phone]                                                                          [phone] (for deaf or hearing impaired) no

## 2021-03-14 LAB
A1C WITH ESTIMATED AVERAGE GLUCOSE RESULT: 5.1 % — SIGNIFICANT CHANGE UP (ref 4–5.6)
ANION GAP SERPL CALC-SCNC: 15 MMOL/L — HIGH (ref 7–14)
BUN SERPL-MCNC: 18 MG/DL — SIGNIFICANT CHANGE UP (ref 7–23)
CALCIUM SERPL-MCNC: 9.3 MG/DL — SIGNIFICANT CHANGE UP (ref 8.4–10.5)
CHLORIDE SERPL-SCNC: 103 MMOL/L — SIGNIFICANT CHANGE UP (ref 98–107)
CO2 SERPL-SCNC: 18 MMOL/L — LOW (ref 22–31)
CREAT SERPL-MCNC: 0.74 MG/DL — SIGNIFICANT CHANGE UP (ref 0.5–1.3)
ESTIMATED AVERAGE GLUCOSE: 100 MG/DL — SIGNIFICANT CHANGE UP (ref 68–114)
GLUCOSE BLDC GLUCOMTR-MCNC: 108 MG/DL — HIGH (ref 70–99)
GLUCOSE BLDC GLUCOMTR-MCNC: 133 MG/DL — HIGH (ref 70–99)
GLUCOSE BLDC GLUCOMTR-MCNC: 92 MG/DL — SIGNIFICANT CHANGE UP (ref 70–99)
GLUCOSE BLDC GLUCOMTR-MCNC: 95 MG/DL — SIGNIFICANT CHANGE UP (ref 70–99)
GLUCOSE SERPL-MCNC: 93 MG/DL — SIGNIFICANT CHANGE UP (ref 70–99)
MAGNESIUM SERPL-MCNC: 2.2 MG/DL — SIGNIFICANT CHANGE UP (ref 1.6–2.6)
PHOSPHATE SERPL-MCNC: 3.5 MG/DL — SIGNIFICANT CHANGE UP (ref 2.5–4.5)
POTASSIUM SERPL-MCNC: 4.3 MMOL/L — SIGNIFICANT CHANGE UP (ref 3.5–5.3)
POTASSIUM SERPL-SCNC: 4.3 MMOL/L — SIGNIFICANT CHANGE UP (ref 3.5–5.3)
SODIUM SERPL-SCNC: 136 MMOL/L — SIGNIFICANT CHANGE UP (ref 135–145)

## 2021-03-14 PROCEDURE — 99233 SBSQ HOSP IP/OBS HIGH 50: CPT | Mod: GC

## 2021-03-14 RX ORDER — OLANZAPINE 15 MG/1
1.25 TABLET, FILM COATED ORAL EVERY 4 HOURS
Refills: 0 | Status: DISCONTINUED | OUTPATIENT
Start: 2021-03-14 | End: 2021-03-14

## 2021-03-14 RX ORDER — OLANZAPINE 15 MG/1
1.25 TABLET, FILM COATED ORAL EVERY 6 HOURS
Refills: 0 | Status: DISCONTINUED | OUTPATIENT
Start: 2021-03-14 | End: 2021-03-17

## 2021-03-14 RX ADMIN — Medication 250 MILLIGRAM(S): at 17:58

## 2021-03-14 RX ADMIN — Medication 50 MILLIGRAM(S): at 21:43

## 2021-03-14 RX ADMIN — Medication 0.5 MILLIGRAM(S): at 05:28

## 2021-03-14 RX ADMIN — Medication 375 MILLIGRAM(S): at 10:13

## 2021-03-14 RX ADMIN — Medication 100 MILLIGRAM(S): at 11:36

## 2021-03-14 RX ADMIN — Medication 0.5 MILLIGRAM(S): at 17:58

## 2021-03-14 RX ADMIN — Medication 81 MILLIGRAM(S): at 11:36

## 2021-03-14 NOTE — DIETITIAN INITIAL EVALUATION ADULT. - PERTINENT MEDS FT
dextrose 40% Gel  dextrose 5%.  dextrose 5%.  dextrose 50% Injectable  dextrose 50% Injectable  dextrose 50% Injectable  glucagon  Injectable  insulin lispro (ADMELOG) corrective regimen sliding scale  insulin lispro (ADMELOG) corrective regimen sliding scale  LORazepam     Tablet  thiamine  traZODone  valproic  acid Syrup  valproic  acid Syrup

## 2021-03-14 NOTE — DIETITIAN INITIAL EVALUATION ADULT. - ORAL INTAKE PTA/DIET HISTORY
Met patient at bedside. At baseline patient's wife cooks meals at home. Patient reports consuming 2 meals/day, endorses following a carb controlled diet, managing his diabetes prior to admission. Pt denies any decrease in appetite/po intake PTA.

## 2021-03-14 NOTE — BH CONSULTATION LIAISON PROGRESS NOTE - ORIENTATION
Oriented to self, hospital, month, day, date. However said it was [facility] and believed it was the year 2002.

## 2021-03-14 NOTE — DIETITIAN INITIAL EVALUATION ADULT. - PERTINENT LABORATORY DATA
03-14 Na136 mmol/L Glu 93 mg/dL K+ 4.3 mmol/L Cr  0.74 mg/dL BUN 18 mg/dL 03-14 Phos 3.5 mg/dL 03-12 Alb 3.7 g/dL    HbA1c: 5.1% (3/14/21)

## 2021-03-14 NOTE — DIETITIAN INITIAL EVALUATION ADULT. - PROBLEM SELECTOR PLAN 1
Report of aggressin and agitation  In the setting of vascular dementia, depression  At the time of H&P, the history gathered seems to suggest patient was on aripiprazole (which he had been taking for over a year), was changed to quetiapine without benztropine, however patient did not tolerate as he seemed to have developed extrapyramidal side effects from antipsychotic treatment. Subsequently patient's antipsychotics discontinued, and at the same time 10/2020, lorazepam was started. Eventually, 2/2021, patient started on valproic acid as well. Given that there has been no acute change in psychiatric symptoms, patient's presentation is less concerning for infectious etiology. Patient's chronic symptoms would likely be best addressed with more optimal psych/neuro medication regimen.  Continuing PTA meds for now    -psychiatric consult for evaluation/modification of therapy  -per documentation from transferring facility, patient was intended for transfer to [facility]  -f/u TSH, folate, B12.  Already on Vit B1

## 2021-03-14 NOTE — DIETITIAN INITIAL EVALUATION ADULT. - PROBLEM SELECTOR PLAN 3
NH reports 5 Lantus qHS. Blood glucose here wnl thus far, most recent 85. Will c/w monitoring off of meds.  -C/C diet  -FS before meals and at bedtime  -f/u A1c

## 2021-03-14 NOTE — DIETITIAN INITIAL EVALUATION ADULT. - OTHER INFO
RD observed that patient consumed 75% of lunch tray today. In-house patient endorses good appetite, expressing feeling hungry after his meals. Patient reports that he would prefer softer foods due to poor dentition. Patient denies any n/v/d/c at this time.     UBW - 160 lbs per patient report. RD reached out to patient's sister who confirmed that patient did lose weight since patient has been in the NH x 2-3 years.   Admit weight: 128.7 lb  Weight change ~20% (30 lbs). Notable however unable to assess significance due to unknown exact timeframe.

## 2021-03-15 LAB
ANION GAP SERPL CALC-SCNC: 10 MMOL/L — SIGNIFICANT CHANGE UP (ref 7–14)
BUN SERPL-MCNC: 19 MG/DL — SIGNIFICANT CHANGE UP (ref 7–23)
CALCIUM SERPL-MCNC: 9.3 MG/DL — SIGNIFICANT CHANGE UP (ref 8.4–10.5)
CHLORIDE SERPL-SCNC: 103 MMOL/L — SIGNIFICANT CHANGE UP (ref 98–107)
CO2 SERPL-SCNC: 25 MMOL/L — SIGNIFICANT CHANGE UP (ref 22–31)
CREAT SERPL-MCNC: 0.75 MG/DL — SIGNIFICANT CHANGE UP (ref 0.5–1.3)
GLUCOSE BLDC GLUCOMTR-MCNC: 114 MG/DL — HIGH (ref 70–99)
GLUCOSE BLDC GLUCOMTR-MCNC: 117 MG/DL — HIGH (ref 70–99)
GLUCOSE BLDC GLUCOMTR-MCNC: 131 MG/DL — HIGH (ref 70–99)
GLUCOSE BLDC GLUCOMTR-MCNC: 96 MG/DL — SIGNIFICANT CHANGE UP (ref 70–99)
GLUCOSE SERPL-MCNC: 76 MG/DL — SIGNIFICANT CHANGE UP (ref 70–99)
HCT VFR BLD CALC: 40 % — SIGNIFICANT CHANGE UP (ref 39–50)
HGB BLD-MCNC: 12.4 G/DL — LOW (ref 13–17)
MAGNESIUM SERPL-MCNC: 2.2 MG/DL — SIGNIFICANT CHANGE UP (ref 1.6–2.6)
MCHC RBC-ENTMCNC: 29.1 PG — SIGNIFICANT CHANGE UP (ref 27–34)
MCHC RBC-ENTMCNC: 31 GM/DL — LOW (ref 32–36)
MCV RBC AUTO: 93.9 FL — SIGNIFICANT CHANGE UP (ref 80–100)
NRBC # BLD: 0 /100 WBCS — SIGNIFICANT CHANGE UP
NRBC # FLD: 0 K/UL — SIGNIFICANT CHANGE UP
PHOSPHATE SERPL-MCNC: 2.9 MG/DL — SIGNIFICANT CHANGE UP (ref 2.5–4.5)
PLATELET # BLD AUTO: 172 K/UL — SIGNIFICANT CHANGE UP (ref 150–400)
POTASSIUM SERPL-MCNC: 3.5 MMOL/L — SIGNIFICANT CHANGE UP (ref 3.5–5.3)
POTASSIUM SERPL-SCNC: 3.5 MMOL/L — SIGNIFICANT CHANGE UP (ref 3.5–5.3)
RBC # BLD: 4.26 M/UL — SIGNIFICANT CHANGE UP (ref 4.2–5.8)
RBC # FLD: 15.2 % — HIGH (ref 10.3–14.5)
SARS-COV-2 RNA SPEC QL NAA+PROBE: SIGNIFICANT CHANGE UP
SODIUM SERPL-SCNC: 138 MMOL/L — SIGNIFICANT CHANGE UP (ref 135–145)
WBC # BLD: 7.54 K/UL — SIGNIFICANT CHANGE UP (ref 3.8–10.5)
WBC # FLD AUTO: 7.54 K/UL — SIGNIFICANT CHANGE UP (ref 3.8–10.5)

## 2021-03-15 PROCEDURE — 99232 SBSQ HOSP IP/OBS MODERATE 35: CPT | Mod: GC

## 2021-03-15 RX ORDER — POTASSIUM CHLORIDE 20 MEQ
20 PACKET (EA) ORAL ONCE
Refills: 0 | Status: COMPLETED | OUTPATIENT
Start: 2021-03-15 | End: 2021-03-15

## 2021-03-15 RX ADMIN — Medication 20 MILLIEQUIVALENT(S): at 17:45

## 2021-03-15 RX ADMIN — Medication 375 MILLIGRAM(S): at 11:38

## 2021-03-15 RX ADMIN — Medication 81 MILLIGRAM(S): at 11:39

## 2021-03-15 RX ADMIN — Medication 0.5 MILLIGRAM(S): at 05:53

## 2021-03-15 RX ADMIN — Medication 0.5 MILLIGRAM(S): at 17:43

## 2021-03-15 RX ADMIN — Medication 50 MILLIGRAM(S): at 21:52

## 2021-03-15 RX ADMIN — Medication 250 MILLIGRAM(S): at 17:43

## 2021-03-15 RX ADMIN — Medication 100 MILLIGRAM(S): at 11:38

## 2021-03-15 NOTE — BH CONSULTATION LIAISON PROGRESS NOTE - CURRENT MEDICATION
MEDICATIONS  (STANDING):  aspirin enteric coated 81 milliGRAM(s) Oral daily  dextrose 40% Gel 15 Gram(s) Oral once  dextrose 5%. 1000 milliLiter(s) (50 mL/Hr) IV Continuous <Continuous>  dextrose 5%. 1000 milliLiter(s) (100 mL/Hr) IV Continuous <Continuous>  dextrose 50% Injectable 25 Gram(s) IV Push once  dextrose 50% Injectable 12.5 Gram(s) IV Push once  dextrose 50% Injectable 25 Gram(s) IV Push once  glucagon  Injectable 1 milliGRAM(s) IntraMuscular once  insulin lispro (ADMELOG) corrective regimen sliding scale   SubCutaneous three times a day before meals  insulin lispro (ADMELOG) corrective regimen sliding scale   SubCutaneous at bedtime  LORazepam     Tablet 0.5 milliGRAM(s) Oral two times a day  potassium chloride   Solution 20 milliEquivalent(s) Oral once  thiamine 100 milliGRAM(s) Oral daily  traZODone 50 milliGRAM(s) Oral at bedtime  valproic  acid Syrup 375 milliGRAM(s) Oral <User Schedule>  valproic  acid Syrup 250 milliGRAM(s) Oral <User Schedule>    MEDICATIONS  (PRN):  OLANZapine 1.25 milliGRAM(s) Oral every 6 hours PRN agitation  OLANZapine Injectable 1.25 milliGRAM(s) IntraMuscular every 6 hours PRN agitation
MEDICATIONS  (STANDING):  aspirin enteric coated 81 milliGRAM(s) Oral daily  dextrose 40% Gel 15 Gram(s) Oral once  dextrose 5%. 1000 milliLiter(s) (50 mL/Hr) IV Continuous <Continuous>  dextrose 5%. 1000 milliLiter(s) (100 mL/Hr) IV Continuous <Continuous>  dextrose 50% Injectable 25 Gram(s) IV Push once  dextrose 50% Injectable 12.5 Gram(s) IV Push once  dextrose 50% Injectable 25 Gram(s) IV Push once  glucagon  Injectable 1 milliGRAM(s) IntraMuscular once  insulin lispro (ADMELOG) corrective regimen sliding scale   SubCutaneous three times a day before meals  insulin lispro (ADMELOG) corrective regimen sliding scale   SubCutaneous at bedtime  LORazepam     Tablet 0.5 milliGRAM(s) Oral two times a day  thiamine 100 milliGRAM(s) Oral daily  traZODone 50 milliGRAM(s) Oral at bedtime  valproic  acid Syrup 250 milliGRAM(s) Oral <User Schedule>  valproic  acid Syrup 375 milliGRAM(s) Oral <User Schedule>    MEDICATIONS  (PRN):  OLANZapine 1.25 milliGRAM(s) Oral every 6 hours PRN agitation  OLANZapine Injectable 1.25 milliGRAM(s) IntraMuscular every 6 hours PRN agitation

## 2021-03-15 NOTE — BH CONSULTATION LIAISON PROGRESS NOTE - NSBHMSEINTELL_PSY_A_CORE
Unable to assess
no deficits with spelling WORLD backwards or abstraction; unable to perform rapid motor sequencing (fist-chop-clap)/Average

## 2021-03-15 NOTE — BH CONSULTATION LIAISON PROGRESS NOTE - NSBHASSESSMENTFT_PSY_ALL_CORE
61M domiciled at NH w/ PMHx of CAD s/p PCI, DMII w/ PPHx of bipolar disorder, MDD, vascular dementia, psychosis, and episodes of aggressive behavior as per NH staff and has been worsening while assisting patient with ADLs. Behaviors such as aggressive biting, pushing staff, bending staff hands/fingers. Patient arrived to NH about 2 years ago previously on Abilify and Cogentin which were discontinued 10/2020 for unclear reasons. As per charts patient was given Seroquel was for about a week in 10/2020 and subsequently developed protruding tongue movements, so discontinued and Ativan started for mood control along with VPA now on Depakene syrup 375mg PO daily and 250mg PO at night. Since approximately 10/2020 behavior and aggression has worsened and becoming more persistent. Psychiatry consulted for agitation.    Today, patient presenting as oriented and appropriate, with delirium work thus far unremarkable. Patient has not required PRNs over weekend. Of note, patient's valproic acid level on admission was negligible despite ostensible home regimen. Strongly suspect that improved symptoms are secondary to Depakene loading here.    PLAN:  - routine observation  - C/w Depakene 375mg PO in AM and 250mg PO in the evening. Repeat level tomorrow morning. If in therapeutic range, can be discharged on this regimen; residence should consider liquid or other alternative formulation (sprinkles, etc.) to ensure adherence.  - C/w Trazadone 50mg PO qHS and Ativan 0.5mg PO BID for now; consider tapering Ativan in outpatient setting  - For SEVERE aggression/agitation can use Zyprexa 1.25mg PO/IM q 6 hours PRN; IM Haldol cannot be given within 1hr of Ativan  - Would avoid Haldol as more risk for EPS or high doses of Zyprexa
61M domiciled at NH w/ PMHx of CAD s/p PCI, DMII w/ PPHx of bipolar disorder, MDD, vascular dementia, psychosis, and episodes of aggressive behavior as per NH staff and has been worsening while assisting patient with ADLs. Behaviors such as aggressive biting, pushing staff, bending staff hands/fingers. Patient arrived to NH about 2 years ago previously on Abilify and Cogentin which were discontinued 10/2020 for unclear reasons. As per charts patient was given Seroquel was for about a week in 10/2020 and subsequently developed protruding tongue movements, so discontinued and Ativan started for mood control along with VPA now on Depakene syrup 375mg PO daily and 250mg PO at night. Since approximately 10/2020 behavior and aggression has worsened and becoming more persistent. Psychiatry consulted for agitation.    Patient w/ repetitive oral fasciculations and upper/lower extremity movements, restlessness, and emotionally labile unable to provide verbal needs.   No overt signs of infection noted; TSH, B12, folate WNL; VPA level 5; LFTs and PLTs WNL.  Likely progressive dementia w/ behavioral disturbance vs medication-induced akathisias vs possible symptoms of pseudobulbar affect    PLAN:  - routine observation  - Consider CTH to assess if any overt reversible changes  - check ammonia level  - C/w Depakene 375mg PO in AM and 250mg PO in the evening  - check VPA level every 4 days  - C/w Trazadone 50mg PO qHS, as well as, Ativan 0.5mg PO BID for now   - For SEVERE aggression/agitation can use Zyprexa 1.25mg PO/IM q 6 hours PRN  - Would avoid Haldol as more risk for EPS or high doses of Zyprexa  - If Zyprexa is ineffective consider additional Ativan 0.5-1mg PO and try to avoid IV in the setting of cannot use Zyprexa IM within 3 hours of Ativan IV/IM  - Potential to increase VPA regimen for better behavioral control; Psychiatry to follow-up tomorrow; Potential to increase mood stabilization regimen.

## 2021-03-15 NOTE — BH CONSULTATION LIAISON PROGRESS NOTE - NSBHCHARTREVIEWLAB_PSY_A_CORE FT
12.4   7.54  )-----------( 172      ( 15 Mar 2021 08:11 )             40.0     03-15    138  |  103  |  19  ----------------------------<  76  3.5   |  25  |  0.75    Ca    9.3      15 Mar 2021 08:11  Phos  2.9     03-15  Mg     2.2     03-15

## 2021-03-15 NOTE — BH CONSULTATION LIAISON PROGRESS NOTE - CASE SUMMARY
61M PPH of vascular dementia, bipolar d/o?, PMHx of CAD s/p PCI, DMII, sent to [facility] for agitation from NH. On admission VPA level <5, indicated likely n/c with meds as given in NH. Psych c/s for agitation. Patient calm and cooperative here, denies AH/VH, AOx3, recall 1/3, attention labored but fair, abstraction intact, repetition intact, naming intact. amenable to c/w depakote as ordered, would get VPA trough level prior to discharge. No safety concerns noted, patient in very good behavioral control here. Denies suicidality/intent or plan and denies homicidality/intent or plan./HI. Denies AH/VH delusions or paranoia.  61M PPH of vascular dementia, bipolar d/o?, PMHx of CAD s/p PCI, DMII, sent to [facility] for agitation from NH. On admission VPA level <5, indicated likely n/c with meds as given in NH. Psych c/s for agitation. Patient calm and cooperative here, denies AH/VH, AOx3, recall 1/3, attention labored but fair, abstraction intact, repetition intact, naming intact. amenable to c/w depakote as ordered, would get VPA trough level prior to discharge. No safety concerns noted, patient in very good behavioral control here. Denies suicidality/intent or plan and denies homicidality/intent or plan./HI. Denies AH/VH delusions or paranoia.       No indication or expectation of further improvement with psychiatric hospitalization, no psych contraindications to d/c. Cleared from psych perspective for d/c to rehab or other safe dispo.

## 2021-03-15 NOTE — BH CONSULTATION LIAISON PROGRESS NOTE - NSBHCHARTREVIEWVS_PSY_A_CORE FT
Vital Signs Last 24 Hrs  T(C): 36.6 (15 Mar 2021 06:26), Max: 36.8 (14 Mar 2021 15:39)  T(F): 97.8 (15 Mar 2021 06:26), Max: 98.3 (14 Mar 2021 15:39)  HR: 57 (15 Mar 2021 06:26) (56 - 58)  BP: 101/56 (15 Mar 2021 06:26) (101/56 - 107/57)  BP(mean): --  RR: 16 (15 Mar 2021 06:26) (16 - 17)  SpO2: 100% (15 Mar 2021 06:26) (100% - 100%)
Vital Signs Last 24 Hrs  T(C): 36.7 (14 Mar 2021 05:31), Max: 37.1 (13 Mar 2021 12:48)  T(F): 98.1 (14 Mar 2021 05:31), Max: 98.7 (13 Mar 2021 12:48)  HR: 58 (14 Mar 2021 05:31) (50 - 64)  BP: 108/55 (14 Mar 2021 05:31) (90/55 - 108/55)  BP(mean): --  RR: 17 (14 Mar 2021 05:31) (17 - 20)  SpO2: 100% (14 Mar 2021 05:31) (100% - 100%)

## 2021-03-15 NOTE — BH CONSULTATION LIAISON PROGRESS NOTE - NSBHMSESPEECH_PSY_A_CORE
Normal volume, rate, productivity, spontaneity and articulation
Abnormal as indicated, otherwise normal...

## 2021-03-16 ENCOUNTER — TRANSCRIPTION ENCOUNTER (OUTPATIENT)
Age: 61
End: 2021-03-16

## 2021-03-16 LAB
ANION GAP SERPL CALC-SCNC: 9 MMOL/L — SIGNIFICANT CHANGE UP (ref 7–14)
BUN SERPL-MCNC: 16 MG/DL — SIGNIFICANT CHANGE UP (ref 7–23)
CALCIUM SERPL-MCNC: 9.3 MG/DL — SIGNIFICANT CHANGE UP (ref 8.4–10.5)
CHLORIDE SERPL-SCNC: 104 MMOL/L — SIGNIFICANT CHANGE UP (ref 98–107)
CO2 SERPL-SCNC: 24 MMOL/L — SIGNIFICANT CHANGE UP (ref 22–31)
CREAT SERPL-MCNC: 0.67 MG/DL — SIGNIFICANT CHANGE UP (ref 0.5–1.3)
GLUCOSE BLDC GLUCOMTR-MCNC: 102 MG/DL — HIGH (ref 70–99)
GLUCOSE BLDC GLUCOMTR-MCNC: 106 MG/DL — HIGH (ref 70–99)
GLUCOSE BLDC GLUCOMTR-MCNC: 114 MG/DL — HIGH (ref 70–99)
GLUCOSE BLDC GLUCOMTR-MCNC: 124 MG/DL — HIGH (ref 70–99)
GLUCOSE SERPL-MCNC: 102 MG/DL — HIGH (ref 70–99)
HCT VFR BLD CALC: 41 % — SIGNIFICANT CHANGE UP (ref 39–50)
HGB BLD-MCNC: 12.8 G/DL — LOW (ref 13–17)
MAGNESIUM SERPL-MCNC: 2.1 MG/DL — SIGNIFICANT CHANGE UP (ref 1.6–2.6)
MCHC RBC-ENTMCNC: 29 PG — SIGNIFICANT CHANGE UP (ref 27–34)
MCHC RBC-ENTMCNC: 31.2 GM/DL — LOW (ref 32–36)
MCV RBC AUTO: 92.8 FL — SIGNIFICANT CHANGE UP (ref 80–100)
NRBC # BLD: 0 /100 WBCS — SIGNIFICANT CHANGE UP
NRBC # FLD: 0 K/UL — SIGNIFICANT CHANGE UP
PHOSPHATE SERPL-MCNC: 3.3 MG/DL — SIGNIFICANT CHANGE UP (ref 2.5–4.5)
PLATELET # BLD AUTO: 173 K/UL — SIGNIFICANT CHANGE UP (ref 150–400)
POTASSIUM SERPL-MCNC: 4.1 MMOL/L — SIGNIFICANT CHANGE UP (ref 3.5–5.3)
POTASSIUM SERPL-SCNC: 4.1 MMOL/L — SIGNIFICANT CHANGE UP (ref 3.5–5.3)
RBC # BLD: 4.42 M/UL — SIGNIFICANT CHANGE UP (ref 4.2–5.8)
RBC # FLD: 15.2 % — HIGH (ref 10.3–14.5)
SODIUM SERPL-SCNC: 137 MMOL/L — SIGNIFICANT CHANGE UP (ref 135–145)
VALPROATE SERPL-MCNC: 30.5 UG/ML — LOW (ref 50–100)
WBC # BLD: 7.55 K/UL — SIGNIFICANT CHANGE UP (ref 3.8–10.5)
WBC # FLD AUTO: 7.55 K/UL — SIGNIFICANT CHANGE UP (ref 3.8–10.5)

## 2021-03-16 PROCEDURE — 99232 SBSQ HOSP IP/OBS MODERATE 35: CPT

## 2021-03-16 RX ADMIN — Medication 50 MILLIGRAM(S): at 22:46

## 2021-03-16 RX ADMIN — Medication 100 MILLIGRAM(S): at 12:50

## 2021-03-16 RX ADMIN — Medication 375 MILLIGRAM(S): at 09:50

## 2021-03-16 RX ADMIN — Medication 0.5 MILLIGRAM(S): at 18:18

## 2021-03-16 RX ADMIN — OLANZAPINE 1.25 MILLIGRAM(S): 15 TABLET, FILM COATED ORAL at 22:46

## 2021-03-16 RX ADMIN — Medication 250 MILLIGRAM(S): at 18:15

## 2021-03-16 RX ADMIN — Medication 0.5 MILLIGRAM(S): at 06:22

## 2021-03-16 RX ADMIN — Medication 81 MILLIGRAM(S): at 12:50

## 2021-03-16 NOTE — CHART NOTE - NSCHARTNOTEFT_GEN_A_CORE
Per Ex-Wife Mrs. [facility] [employer] [facility] patient already received COVID vaccine in nursing home.     Hugh Alcantara

## 2021-03-16 NOTE — DISCHARGE NOTE PROVIDER - HOSPITAL COURSE
61M PMHx bipolar d/o, major depressive d/o, psychosis, vascular dementia, CAD (s/p PCI), DM2, referred by [facility] for aggressive behavior. As per nursing supervisor, Aniya Burns, while staff assisting patient with ADLs, patient has been exhibiting aggressive biting, pushing staff, bending staff hands/fingers. Patient was seen and evaluated at [facility] earlier in the week for this behavior and was sent back to the NH. The nursing supervisor stated that she was concerned about staff safety. Psychiatry was consulted and patient was maintained on home regimen of antipsychotics without any acute episodes of agitation and was not recommended for admission to [facility]. We recommend increasing dose of valproate to 375mg BID in outpatient setting with repeat level in 4-5 days after steady state has been achieved, but this does not require ongoing inpatient admission given symptom stability and medical clearance otherwise. Given that the valproate level was nearly 0 on admission, would encourage outpatient use of Depakene syrup as has been used here to promote adherence, or else consider alternative dosing methods such as sprinkles, etc.    At the time of discharge, patient was hemodynamically stable and clinically improved.

## 2021-03-16 NOTE — DISCHARGE NOTE NURSING/CASE MANAGEMENT/SOCIAL WORK - PATIENT PORTAL LINK FT
You can access the FollowMyHealth Patient Portal offered by [facility] by registering at the following website: [email or web address]. By joining [email or web address]’s FollowMyHealth portal, you will also be able to view your health information using other applications (apps) compatible with our system.

## 2021-03-16 NOTE — DISCHARGE NOTE NURSING/CASE MANAGEMENT/SOCIAL WORK - NSDCCRNAME_GEN_ALL_CORE_FT
[facility] [place], [address] [place] [address] P:[phone]

## 2021-03-16 NOTE — CHART NOTE - NSCHARTNOTEFT_GEN_A_CORE
Per progress note yesterday, no psychiatric contraindications to discharge - patient has had no agitation and has required no PRNs since 3/12. Valproic acid level came back low at 30.5 - recommend increasing dose to 375mg BID in outpatient setting with repeat level in 4-5 days after steady state has been achieved, but this does not require ongoing inpatient admission given symptom stability and medical clearance otherwise. Given that patient's level was nearly 0 on admission, would encourage outpatient use of Depakene syrup as has been used here to promote adherence, or else consider alternative dosing methods such as sprinkles, etc.    Shay Grissom, PGY-2  Psychiatry

## 2021-03-16 NOTE — DISCHARGE NOTE PROVIDER - NSDCMRMEDTOKEN_GEN_ALL_CORE_FT
acetaminophen 325 mg oral tablet: 2 tab(s) orally 2 times a day  aspirin 81 mg oral tablet: 1  orally once a day  Lantus 100 units/mL subcutaneous solution: 5 unit(s) subcutaneous once a day (at bedtime)  LORazepam 0.5 mg oral tablet: 1 tab(s) orally 2 times a day  traZODone 50 mg oral tablet: 1 tab(s) orally once a day (at bedtime)  valproic acid 250 mg/5 mL oral liquid: 1 dose(s) orally at bedtime  valproic acid 250 mg/5 mL oral liquid: 375 milligram(s) orally once a day in the morning  Vitamin B1 100 mg oral tablet: 1 tab(s) orally once a day

## 2021-03-16 NOTE — DISCHARGE NOTE NURSING/CASE MANAGEMENT/SOCIAL WORK - NSDPFAC_GEN_ALL_CORE
[facility], [address] [place] [address] P:[phone]

## 2021-03-16 NOTE — DISCHARGE NOTE PROVIDER - NSDCCPCAREPLAN_GEN_ALL_CORE_FT
PRINCIPAL DISCHARGE DIAGNOSIS  Diagnosis: Agitation  Assessment and Plan of Treatment: You were originally admitted for agitation at the nursing home and you were maintained on your home regimen of medications without any episodes of agitation in the hospital. We recommend increasing dose of valproate to 375mg BID in outpatient setting with repeat level in 4-5 days after steady state has been achieved, but this does not require ongoing inpatient admission given symptom stability and medical clearance otherwise. Given that the valproate level was nearly 0 on admission, would encourage outpatient use of Depakene syrup as has been used here to promote adherence, or else consider alternative dosing methods such as sprinkles, etc.

## 2021-03-17 VITALS
HEART RATE: 54 BPM | TEMPERATURE: 98 F | WEIGHT: 133.82 LBS | OXYGEN SATURATION: 100 % | DIASTOLIC BLOOD PRESSURE: 58 MMHG | SYSTOLIC BLOOD PRESSURE: 108 MMHG | RESPIRATION RATE: 18 BRPM

## 2021-03-17 LAB
GLUCOSE BLDC GLUCOMTR-MCNC: 100 MG/DL — HIGH (ref 70–99)
GLUCOSE BLDC GLUCOMTR-MCNC: 167 MG/DL — HIGH (ref 70–99)

## 2021-03-17 PROCEDURE — 99239 HOSP IP/OBS DSCHRG MGMT >30: CPT | Mod: GC

## 2021-03-17 RX ORDER — INSULIN GLARGINE 100 [IU]/ML
5 INJECTION, SOLUTION SUBCUTANEOUS
Qty: 0 | Refills: 0 | DISCHARGE

## 2021-03-17 RX ORDER — THIAMINE MONONITRATE (VIT B1) 100 MG
1 TABLET ORAL
Qty: 0 | Refills: 0 | DISCHARGE

## 2021-03-17 RX ORDER — TRAZODONE HCL 50 MG
1 TABLET ORAL
Qty: 0 | Refills: 0 | DISCHARGE

## 2021-03-17 RX ORDER — VALPROIC ACID (AS SODIUM SALT) 250 MG/5ML
375 SOLUTION, ORAL ORAL
Qty: 0 | Refills: 0 | DISCHARGE

## 2021-03-17 RX ORDER — ASPIRIN/CALCIUM CARB/MAGNESIUM 324 MG
1 TABLET ORAL
Qty: 0 | Refills: 0 | DISCHARGE

## 2021-03-17 RX ORDER — ACETAMINOPHEN 500 MG
2 TABLET ORAL
Qty: 0 | Refills: 0 | DISCHARGE

## 2021-03-17 RX ORDER — VALPROIC ACID (AS SODIUM SALT) 250 MG/5ML
1 SOLUTION, ORAL ORAL
Qty: 0 | Refills: 0 | DISCHARGE

## 2021-03-17 RX ADMIN — Medication 375 MILLIGRAM(S): at 10:47

## 2021-03-17 RX ADMIN — Medication 1: at 13:14

## 2021-03-17 RX ADMIN — OLANZAPINE 1.25 MILLIGRAM(S): 15 TABLET, FILM COATED ORAL at 13:19

## 2021-03-17 RX ADMIN — Medication 0.5 MILLIGRAM(S): at 05:41

## 2021-03-17 NOTE — PROGRESS NOTE ADULT - ATTENDING COMMENTS
60 y/o M with PMH BPD, MDD, psychosis, vascular dementia who presents from NH for aggressive behavior, admitted for medication management.     #Aggressive behavior, stable here. No need for PRNs over the weekend. Pleasant and A&Ox3 on my exam today. F/u psych rec's. Increased the valproate to 375mg BID. Valproate level 30 today. Plan to discharge back to [facility] with instructions to check the valproate level in 4-5 days. Patient's initial aggressive behavior is improved and he is now calm. The patient is medically stable for discharge to [facility] today.     Plan discussed with HS8    >42 minutes of time spent coordinating care and discharge management.
62 y/o M with PMH BPD, MDD, psychosis, vascular dementia who presents from NH for aggressive behavior, admitted for medication management.     #Aggressive behavior, stable here. No need for PRNs over the weekend. Pleasant and A&Ox3 on my exam today. F/u psych rec's. Continue with current regimen. If no need for inpatient psych admission will medically clear the patient and discuss placement for discharge.     Plan discussed with HS8
62 y/o M with PMH BPD, MDD, psychosis, vascular dementia who presents from NH for aggressive behavior, admitted for medication management.     #Aggressive behavior, stable here. No need for PRNs over the weekend. Pleasant and A&Ox3 on my exam today. F/u psych rec's. Increased the valproate to 375mg BID. Valproate level 30 yesterday. Plan to discharge back to [facility] with instructions to check the valproate level in 4-5 days. Patient's initial aggressive behavior is improved and he is now calm. The patient is medically stable for discharge to [facility] today. However, patient was not aware he was going back to [facility] and was not happy with that plan. Dispo pending.     Plan discussed with HS8    >42 minutes of time spent coordinating care and discharge management.
Patient was seen and examined personally by me. I have discussed the plan and reviewed the Resident's note and agree with the above physical exam findings including assessment and plan except as indicated below. Labs and imagining reviewed.     61M with early onset alzheimer's dementia presented from NH for agitation. unclear etiology of patient's agitation. he is calm and resting in be at this time. c/w home psychiatric regimen. f/u psych for further medication adjustment.
Patient was seen and examined personally by me. I have discussed the plan and reviewed the Resident's note and agree with the above physical exam findings including assessment and plan except as indicated below. Labs and imagining reviewed.     patient seen today, resting in bed, calm. intermittently confused. not aggressive. psych following for medication adjustment. if remain calm, d/c back to NH tomorrow.

## 2021-03-17 NOTE — PROGRESS NOTE ADULT - PROBLEM SELECTOR PLAN 3
NH reports 5 Lantus qHS. Blood glucose here wnl thus far, most recent 85. Will c/w monitoring off of meds.  -C/C diet  -FS before meals and at bedtime  -a1c 5.1  ISS for now.
NH reports 5 Lantus qHS. Blood glucose here wnl thus far, most recent 85. Will c/w monitoring off of meds.  -C/C diet  -FS before meals and at bedtime  -a1c 5.1  ISS for now.
NH reports 5 Lantus qHS. Blood glucose here wnl thus far, most recent 85. Will c/w monitoring off of meds.  -C/C diet  -FS before meals and at bedtime  -f/u A1c
NH reports 5 Lantus qHS. Blood glucose here wnl thus far, most recent 85. Will c/w monitoring off of meds.  -C/C diet  -FS before meals and at bedtime  -a1c 5.1  ISS for now.
NH reports 5 Lantus qHS. Blood glucose here wnl thus far, most recent 85. Will c/w monitoring off of meds.  -C/C diet  -FS before meals and at bedtime  -a1c 5.1  ISS for now.

## 2021-03-17 NOTE — PROGRESS NOTE ADULT - ASSESSMENT
61M PMHx BPD, MDD, psychosis, vascular dementia presenting from NH for aggressive behavior now discharge to NH. 
61M PMHx BPD, MDD, psychosis, vascular dementia presenting from NH for aggressive behavior that seemed to worsen 10/2020, and since then has been persistent and unchanged pending further medical management. 
61M PMHx BPD, MDD, psychosis, vascular dementia presenting from NH for aggressive behavior that seemed to worsen 10/2020, and since then has been persistent and unchanged pending further psychiatric management.
61M PMHx BPD, MDD, psychosis, vascular dementia presenting from NH for aggressive behavior monitoring for steady state valproate level then likely discharge to NH. 
61M PMHx BPD, MDD, psychosis, vascular dementia presenting from NH for aggressive behavior that seemed to worsen 10/2020, and since then has been persistent and unchanged pending further psychiatric management.

## 2021-03-17 NOTE — PROGRESS NOTE ADULT - SUBJECTIVE AND OBJECTIVE BOX
Hugh Alcantara MD  Internal Medicine  Pager #[phone]    Patient is a 61y old  Male who presents with a chief complaint of Persistent aggression, Agitation (14 Mar 2021 15:33)      SUBJECTIVE / OVERNIGHT EVENTS:    ON: no acute issues    no acute distress this AM anticipating returning home. Eating , urinating, having bowel movements no issues.     ADDITIONAL REVIEW OF SYSTEMS:    CONSTITUTIONAL: No weakness, fevers or chills  EYES/ENT: No visual changes;  No vertigo or throat pain.  NECK: No pain or stiffness.  RESPIRATORY: No cough, wheezing, hemoptysis; No shortness of breath.  CARDIOVASCULAR: No chest pain or palpitations  GASTROINTESTINAL: No abdominal pain. No nausea, vomiting, diarrhea, constipation, or melena.  GENITOURINARY: No dysuria, frequency or hematuria  NEUROLOGICAL: No numbness or weakness  SKIN: No itching, burning, rashes, or lesions   All other review of systems is negative unless indicated above.    MEDICATIONS  (STANDING):  aspirin enteric coated 81 milliGRAM(s) Oral daily  dextrose 40% Gel 15 Gram(s) Oral once  dextrose 5%. 1000 milliLiter(s) (50 mL/Hr) IV Continuous <Continuous>  dextrose 5%. 1000 milliLiter(s) (100 mL/Hr) IV Continuous <Continuous>  dextrose 50% Injectable 25 Gram(s) IV Push once  dextrose 50% Injectable 12.5 Gram(s) IV Push once  dextrose 50% Injectable 25 Gram(s) IV Push once  glucagon  Injectable 1 milliGRAM(s) IntraMuscular once  insulin lispro (ADMELOG) corrective regimen sliding scale   SubCutaneous three times a day before meals  insulin lispro (ADMELOG) corrective regimen sliding scale   SubCutaneous at bedtime  LORazepam     Tablet 0.5 milliGRAM(s) Oral two times a day  thiamine 100 milliGRAM(s) Oral daily  traZODone 50 milliGRAM(s) Oral at bedtime  valproic  acid Syrup 250 milliGRAM(s) Oral <User Schedule>  valproic  acid Syrup 375 milliGRAM(s) Oral <User Schedule>    MEDICATIONS  (PRN):  OLANZapine 1.25 milliGRAM(s) Oral every 6 hours PRN agitation  OLANZapine Injectable 1.25 milliGRAM(s) IntraMuscular every 6 hours PRN agitation      CAPILLARY BLOOD GLUCOSE      POCT Blood Glucose.: 108 mg/dL (14 Mar 2021 22:50)  POCT Blood Glucose.: 133 mg/dL (14 Mar 2021 17:48)  POCT Blood Glucose.: 95 mg/dL (14 Mar 2021 13:16)  POCT Blood Glucose.: 92 mg/dL (14 Mar 2021 09:03)    I&O's Summary    14 Mar 2021 07:01  -  15 Mar 2021 07:00  --------------------------------------------------------  IN: 480 mL / OUT: 1900 mL / NET: -1420 mL        PHYSICAL EXAM:  Vital Signs Last 24 Hrs  T(C): 36.6 (15 Mar 2021 06:26), Max: 36.8 (14 Mar 2021 15:39)  T(F): 97.8 (15 Mar 2021 06:26), Max: 98.3 (14 Mar 2021 15:39)  HR: 57 (15 Mar 2021 06:26) (56 - 58)  BP: 101/56 (15 Mar 2021 06:26) (101/56 - 107/57)  BP(mean): --  RR: 16 (15 Mar 2021 06:26) (16 - 17)  SpO2: 100% (15 Mar 2021 06:26) (100% - 100%)    General: thin, NAD  HEENT: NC/AT; PERRL, clear conjunctivae, oral dyskinesia  Neck: supple  Respiratory: CTAB  Cardiovascular: bradycardic, soft heart sounds, no murmurs appreciated  Abdomen: soft, sunken abdomen, NT/ND; +BS x4  Extremities: 2+ peripheral pulses b/l; no LE edema  Skin: normal color and turgor; no rash  Neurological: AOx2-3 with no focal deficits. tongue fasciculations.     LABS:                        12.4   7.54  )-----------( 172      ( 15 Mar 2021 08:11 )             40.0     03-14    136  |  103  |  18  ----------------------------<  93  4.3   |  18<L>  |  0.74    Ca    9.3      14 Mar 2021 07:26  Phos  3.5     03-14  Mg     2.2     03-14                Culture - Urine (collected 12 Mar 2021 14:25)  Source: .Urine Catheterized  Final Report (13 Mar 2021 16:09):    No growth        RADIOLOGY & ADDITIONAL TESTS:  Results Reviewed:   Imaging Personally Reviewed:  Electrocardiogram Personally Reviewed:    COORDINATION OF CARE:  Care Discussed with Consultants/Other Providers [Y/N]:   Prior or Outpatient Records Reviewed [Y/N]:     
  Hugh Alcantara MD  Internal Medicine  Pager #[phone]    Patient is a 61y old  Male who presents with a chief complaint of Persistent aggression, Agitation (13 Mar 2021 08:16)      SUBJECTIVE / OVERNIGHT EVENTS:    No overnight events    No acute distress.     ADDITIONAL REVIEW OF SYSTEMS:    CONSTITUTIONAL: No weakness, fevers or chills  EYES/ENT: No visual changes;  No vertigo or throat pain.  NECK: No pain or stiffness.  RESPIRATORY: No cough, wheezing, hemoptysis; No shortness of breath.  CARDIOVASCULAR: No chest pain or palpitations  GASTROINTESTINAL: No abdominal pain. No nausea, vomiting, diarrhea, constipation, or melena.  GENITOURINARY: No dysuria, frequency or hematuria  NEUROLOGICAL: No numbness or weakness  SKIN: No itching, burning, rashes, or lesions   All other review of systems is negative unless indicated above.    MEDICATIONS  (STANDING):  aspirin enteric coated 81 milliGRAM(s) Oral daily  dextrose 40% Gel 15 Gram(s) Oral once  dextrose 5%. 1000 milliLiter(s) (50 mL/Hr) IV Continuous <Continuous>  dextrose 5%. 1000 milliLiter(s) (100 mL/Hr) IV Continuous <Continuous>  dextrose 50% Injectable 25 Gram(s) IV Push once  dextrose 50% Injectable 12.5 Gram(s) IV Push once  dextrose 50% Injectable 25 Gram(s) IV Push once  glucagon  Injectable 1 milliGRAM(s) IntraMuscular once  insulin lispro (ADMELOG) corrective regimen sliding scale   SubCutaneous three times a day before meals  insulin lispro (ADMELOG) corrective regimen sliding scale   SubCutaneous at bedtime  LORazepam     Tablet 0.5 milliGRAM(s) Oral two times a day  thiamine 100 milliGRAM(s) Oral daily  traZODone 50 milliGRAM(s) Oral at bedtime  valproic  acid Syrup 250 milliGRAM(s) Oral <User Schedule>  valproic  acid Syrup 375 milliGRAM(s) Oral <User Schedule>    MEDICATIONS  (PRN):  OLANZapine 1.25 milliGRAM(s) Oral every 4 hours PRN agitation  OLANZapine Injectable 1.25 milliGRAM(s) IntraMuscular every 4 hours PRN agitation      CAPILLARY BLOOD GLUCOSE      POCT Blood Glucose.: 101 mg/dL (13 Mar 2021 22:35)  POCT Blood Glucose.: 110 mg/dL (13 Mar 2021 17:41)  POCT Blood Glucose.: 123 mg/dL (13 Mar 2021 12:40)    I&O's Summary    13 Mar 2021 06:01  -  14 Mar 2021 07:00  --------------------------------------------------------  IN: 1000 mL / OUT: 900 mL / NET: 100 mL        PHYSICAL EXAM:  Vital Signs Last 24 Hrs  T(C): 36.7 (14 Mar 2021 05:31), Max: 37.1 (13 Mar 2021 12:48)  T(F): 98.1 (14 Mar 2021 05:31), Max: 98.7 (13 Mar 2021 12:48)  HR: 58 (14 Mar 2021 05:31) (50 - 64)  BP: 108/55 (14 Mar 2021 05:31) (90/55 - 108/55)  BP(mean): --  RR: 17 (14 Mar 2021 05:31) (17 - 20)  SpO2: 100% (14 Mar 2021 05:31) (100% - 100%)    General: thin, NAD  HEENT: NC/AT; PERRL, clear conjunctivae, oral dyskinesia  Neck: supple  Respiratory: CTAB  Cardiovascular: bradycardic, soft heart sounds, no murmurs appreciated  Abdomen: soft, sunken abdomen, NT/ND; +BS x4  Extremities: 2+ peripheral pulses b/l; no LE edema  Skin: normal color and turgor; no rash  Neurological: AOx1-2 with no focal deficits. tongue fasciculations.     LABS:                        12.8   9.55  )-----------( 180      ( 12 Mar 2021 15:16 )             40.2     03-14    136  |  103  |  18  ----------------------------<  93  4.3   |  18<L>  |  0.74    Ca    9.3      14 Mar 2021 07:26  Phos  3.5     03-14  Mg     2.2     03-14    TPro  7.6  /  Alb  3.7  /  TBili  <0.2  /  DBili  x   /  AST  20  /  ALT  14  /  AlkPhos  96  03-12          Urinalysis Basic - ( 12 Mar 2021 18:54 )    Color: Light Yellow / Appearance: Slightly Turbid / S.020 / pH: x  Gluc: x / Ketone: Negative  / Bili: Negative / Urobili: <2 mg/dL   Blood: x / Protein: Trace / Nitrite: Negative   Leuk Esterase: Negative / RBC: 11 /HPF / WBC 1 /HPF   Sq Epi: x / Non Sq Epi: 0 /HPF / Bacteria: Negative        Culture - Urine (collected 12 Mar 2021 14:25)  Source: .Urine Catheterized  Final Report (13 Mar 2021 16:09):    No growth        RADIOLOGY & ADDITIONAL TESTS:  Results Reviewed:   Imaging Personally Reviewed:  Electrocardiogram Personally Reviewed:    COORDINATION OF CARE:  Care Discussed with Consultants/Other Providers [Y/N]:   Prior or Outpatient Records Reviewed [Y/N]:     
  Hugh Alcantara MD  Internal Medicine  Pager #[phone]    Patient is a 61y old  Male who presents with a chief complaint of Persistent aggression, Agitation (15 Mar 2021 08:38)      SUBJECTIVE / OVERNIGHT EVENTS:    ON: no acute events. not requiring PRNs.     No acute distress.     ADDITIONAL REVIEW OF SYSTEMS:    CONSTITUTIONAL: No weakness, fevers or chills  EYES/ENT: No visual changes;  No vertigo or throat pain.  NECK: No pain or stiffness.  RESPIRATORY: No cough, wheezing, hemoptysis; No shortness of breath.  CARDIOVASCULAR: No chest pain or palpitations  GASTROINTESTINAL: No abdominal pain. No nausea, vomiting, diarrhea, constipation, or melena.  GENITOURINARY: No dysuria, frequency or hematuria  NEUROLOGICAL: No numbness or weakness  SKIN: No itching, burning, rashes, or lesions   All other review of systems is negative unless indicated above.    MEDICATIONS  (STANDING):  aspirin enteric coated 81 milliGRAM(s) Oral daily  dextrose 40% Gel 15 Gram(s) Oral once  dextrose 5%. 1000 milliLiter(s) (50 mL/Hr) IV Continuous <Continuous>  dextrose 5%. 1000 milliLiter(s) (100 mL/Hr) IV Continuous <Continuous>  dextrose 50% Injectable 25 Gram(s) IV Push once  dextrose 50% Injectable 12.5 Gram(s) IV Push once  dextrose 50% Injectable 25 Gram(s) IV Push once  glucagon  Injectable 1 milliGRAM(s) IntraMuscular once  insulin lispro (ADMELOG) corrective regimen sliding scale   SubCutaneous three times a day before meals  insulin lispro (ADMELOG) corrective regimen sliding scale   SubCutaneous at bedtime  LORazepam     Tablet 0.5 milliGRAM(s) Oral two times a day  thiamine 100 milliGRAM(s) Oral daily  traZODone 50 milliGRAM(s) Oral at bedtime  valproic  acid Syrup 375 milliGRAM(s) Oral <User Schedule>  valproic  acid Syrup 250 milliGRAM(s) Oral <User Schedule>    MEDICATIONS  (PRN):  OLANZapine 1.25 milliGRAM(s) Oral every 6 hours PRN agitation  OLANZapine Injectable 1.25 milliGRAM(s) IntraMuscular every 6 hours PRN agitation      CAPILLARY BLOOD GLUCOSE      POCT Blood Glucose.: 131 mg/dL (15 Mar 2021 22:05)  POCT Blood Glucose.: 117 mg/dL (15 Mar 2021 17:35)  POCT Blood Glucose.: 114 mg/dL (15 Mar 2021 12:25)  POCT Blood Glucose.: 96 mg/dL (15 Mar 2021 09:20)    I&O's Summary    15 Mar 2021 07:01  -  16 Mar 2021 07:00  --------------------------------------------------------  IN: 0 mL / OUT: 610 mL / NET: -610 mL        PHYSICAL EXAM:  Vital Signs Last 24 Hrs  T(C): 36.3 (15 Mar 2021 21:23), Max: 36.6 (15 Mar 2021 14:24)  T(F): 97.3 (15 Mar 2021 21:23), Max: 97.9 (15 Mar 2021 14:24)  HR: 58 (15 Mar 2021 21:23) (51 - 58)  BP: 110/53 (15 Mar 2021 21:23) (94/56 - 110/53)  BP(mean): --  RR: 16 (15 Mar 2021 21:23) (16 - 18)  SpO2: 100% (15 Mar 2021 21:23) (100% - 100%)    General: thin, NAD  HEENT: NC/AT; PERRL, clear conjunctivae, oral dyskinesia  Neck: supple  Respiratory: CTAB  Cardiovascular: bradycardic, soft heart sounds, no murmurs appreciated  Abdomen: soft, sunken abdomen, NT/ND; +BS x4  Extremities: 2+ peripheral pulses b/l; no LE edema  Skin: normal color and turgor; no rash  Neurological: AOx2-3 with no focal deficits. tongue fasciculations.     LABS:                        12.4   7.54  )-----------( 172      ( 15 Mar 2021 08:11 )             40.0     03-15    138  |  103  |  19  ----------------------------<  76  3.5   |  25  |  0.75    Ca    9.3      15 Mar 2021 08:11  Phos  2.9     03-15  Mg     2.2     03-15                  RADIOLOGY & ADDITIONAL TESTS:  Results Reviewed:   Imaging Personally Reviewed:  Electrocardiogram Personally Reviewed:    COORDINATION OF CARE:  Care Discussed with Consultants/Other Providers [Y/N]:   Prior or Outpatient Records Reviewed [Y/N]:     
  Hugh Alcantara MD  Internal Medicine  Pager #[phone]    Patient is a 61y old  Male who presents with a chief complaint of Persistent aggression, Agitation (12 Mar 2021 20:45)      SUBJECTIVE / OVERNIGHT EVENTS:    No acute events ON    No acute distress this morning    Patient is hungry and requesting breakfast.     ADDITIONAL REVIEW OF SYSTEMS:    CONSTITUTIONAL: No weakness, fevers or chills  EYES/ENT: No visual changes;  No vertigo or throat pain.  NECK: No pain or stiffness.  RESPIRATORY: No cough, wheezing, hemoptysis; No shortness of breath.  CARDIOVASCULAR: No chest pain or palpitations  GASTROINTESTINAL: No abdominal pain. No nausea, vomiting, diarrhea, constipation, or melena.  GENITOURINARY: No dysuria, frequency or hematuria  NEUROLOGICAL: No numbness or weakness  SKIN: No itching, burning, rashes, or lesions   All other review of systems is negative unless indicated above.    MEDICATIONS  (STANDING):  aspirin enteric coated 81 milliGRAM(s) Oral daily  thiamine 100 milliGRAM(s) Oral daily  traZODone 50 milliGRAM(s) Oral at bedtime  valproic  acid Syrup 250 milliGRAM(s) Oral two times a day    MEDICATIONS  (PRN):  LORazepam     Tablet 0.5 milliGRAM(s) Oral two times a day PRN Agitation      CAPILLARY BLOOD GLUCOSE      POCT Blood Glucose.: 85 mg/dL (12 Mar 2021 23:56)    I&O's Summary      PHYSICAL EXAM:  Vital Signs Last 24 Hrs  T(C): 36.7 (13 Mar 2021 01:28), Max: 36.9 (12 Mar 2021 22:51)  T(F): 98 (13 Mar 2021 01:28), Max: 98.4 (12 Mar 2021 22:51)  HR: 53 (13 Mar 2021 01:) (53 - 66)  BP: 113/54 (13 Mar 2021 01:) (96/57 - 113/54)  BP(mean): --  RR: 18 (13 Mar 2021 01:) (16 - 18)  SpO2: 100% (13 Mar 2021 01:) (100% - 100%)    General: thin, NAD  HEENT: NC/AT; PERRL, clear conjunctivae, oral dyskinesia  Neck: supple  Respiratory: CTAB  Cardiovascular: bradycardic, soft heart sounds, no murmurs appreciated  Abdomen: soft, sunken abdomen, NT/ND; +BS x4  Extremities: 2+ peripheral pulses b/l; no LE edema  Skin: normal color and turgor; no rash  Neurological: AOx1-2 with no focal deficits. tongue fasciculations.     LABS:                        12.8   9.55  )-----------( 180      ( 12 Mar 2021 15:16 )             40.2     03-12    137  |  102  |  18  ----------------------------<  110<H>  4.1   |  27  |  0.67    Ca    9.6      12 Mar 2021 15:16    TPro  7.6  /  Alb  3.7  /  TBili  <0.2  /  DBili  x   /  AST  20  /  ALT  14  /  AlkPhos  96  03-12          Urinalysis Basic - ( 12 Mar 2021 18:54 )    Color: Light Yellow / Appearance: Slightly Turbid / S.020 / pH: x  Gluc: x / Ketone: Negative  / Bili: Negative / Urobili: <2 mg/dL   Blood: x / Protein: Trace / Nitrite: Negative   Leuk Esterase: Negative / RBC: 11 /HPF / WBC 1 /HPF   Sq Epi: x / Non Sq Epi: 0 /HPF / Bacteria: Negative          RADIOLOGY & ADDITIONAL TESTS:  Results Reviewed:   Imaging Personally Reviewed:  Electrocardiogram Personally Reviewed:    COORDINATION OF CARE:  Care Discussed with Consultants/Other Providers [Y/N]:   Prior or Outpatient Records Reviewed [Y/N]:     
  Hugh Alcantara MD  Internal Medicine  Pager #[phone]    Patient is a 61y old  Male who presents with a chief complaint of Persistent aggression, Agitation (16 Mar 2021 12:44)      SUBJECTIVE / OVERNIGHT EVENTS:    ON: no acute events    No acute distress. DC to rehab today patient aware, family aware, SW aware.     ADDITIONAL REVIEW OF SYSTEMS:    CONSTITUTIONAL: No weakness, fevers or chills  EYES/ENT: No visual changes;  No vertigo or throat pain.  NECK: No pain or stiffness.  RESPIRATORY: No cough, wheezing, hemoptysis; No shortness of breath.  CARDIOVASCULAR: No chest pain or palpitations  GASTROINTESTINAL: No abdominal pain. No nausea, vomiting, diarrhea, constipation, or melena.  GENITOURINARY: No dysuria, frequency or hematuria  NEUROLOGICAL: No numbness or weakness  SKIN: No itching, burning, rashes, or lesions   All other review of systems is negative unless indicated above.    MEDICATIONS  (STANDING):  aspirin enteric coated 81 milliGRAM(s) Oral daily  dextrose 40% Gel 15 Gram(s) Oral once  dextrose 5%. 1000 milliLiter(s) (50 mL/Hr) IV Continuous <Continuous>  dextrose 5%. 1000 milliLiter(s) (100 mL/Hr) IV Continuous <Continuous>  dextrose 50% Injectable 25 Gram(s) IV Push once  dextrose 50% Injectable 12.5 Gram(s) IV Push once  dextrose 50% Injectable 25 Gram(s) IV Push once  glucagon  Injectable 1 milliGRAM(s) IntraMuscular once  insulin lispro (ADMELOG) corrective regimen sliding scale   SubCutaneous three times a day before meals  insulin lispro (ADMELOG) corrective regimen sliding scale   SubCutaneous at bedtime  LORazepam     Tablet 0.5 milliGRAM(s) Oral two times a day  thiamine 100 milliGRAM(s) Oral daily  traZODone 50 milliGRAM(s) Oral at bedtime  valproic  acid Syrup 375 milliGRAM(s) Oral <User Schedule>  valproic  acid Syrup 250 milliGRAM(s) Oral <User Schedule>    MEDICATIONS  (PRN):  OLANZapine 1.25 milliGRAM(s) Oral every 6 hours PRN agitation  OLANZapine Injectable 1.25 milliGRAM(s) IntraMuscular every 6 hours PRN agitation      CAPILLARY BLOOD GLUCOSE      POCT Blood Glucose.: 114 mg/dL (16 Mar 2021 22:12)  POCT Blood Glucose.: 124 mg/dL (16 Mar 2021 17:50)  POCT Blood Glucose.: 106 mg/dL (16 Mar 2021 12:17)  POCT Blood Glucose.: 102 mg/dL (16 Mar 2021 09:00)    I&O's Summary    16 Mar 2021 07:01  -  17 Mar 2021 07:00  --------------------------------------------------------  IN: 0 mL / OUT: 700 mL / NET: -700 mL        PHYSICAL EXAM:  Vital Signs Last 24 Hrs  T(C): 36.7 (17 Mar 2021 05:39), Max: 36.9 (16 Mar 2021 21:24)  T(F): 98 (17 Mar 2021 05:39), Max: 98.4 (16 Mar 2021 21:24)  HR: 54 (17 Mar 2021 05:39) (54 - 62)  BP: 108/58 (17 Mar 2021 05:39) (101/57 - 108/58)  BP(mean): --  RR: 18 (17 Mar 2021 05:39) (17 - 18)  SpO2: 100% (17 Mar 2021 05:39) (98% - 100%)    General: thin, NAD  HEENT: NC/AT; PERRL, clear conjunctivae, oral dyskinesia  Neck: supple  Respiratory: CTAB  Cardiovascular: bradycardic, soft heart sounds, no murmurs appreciated  Abdomen: soft, sunken abdomen, NT/ND; +BS x4  Extremities: 2+ peripheral pulses b/l; no LE edema  Skin: normal color and turgor; no rash  Neurological: AOx2-3 with no focal deficits. tongue fasciculations.     LABS:                        12.8   7.55  )-----------( 173      ( 16 Mar 2021 08:09 )             41.0     03-16    137  |  104  |  16  ----------------------------<  102<H>  4.1   |  24  |  0.67    Ca    9.3      16 Mar 2021 08:09  Phos  3.3     03-16  Mg     2.1     03-16                  RADIOLOGY & ADDITIONAL TESTS:  Results Reviewed:   Imaging Personally Reviewed:  Electrocardiogram Personally Reviewed:    COORDINATION OF CARE:  Care Discussed with Consultants/Other Providers [Y/N]:   Prior or Outpatient Records Reviewed [Y/N]:

## 2021-03-17 NOTE — PROGRESS NOTE ADULT - PROBLEM SELECTOR PROBLEM 4
Preventative health care

## 2021-03-17 NOTE — PROGRESS NOTE ADULT - PROBLEM SELECTOR PLAN 1
-aggressive behavior likely not on optimal psychiatric med regimen  -TSH 3.08, folate 13.3, B12 788  -Depakene 375/250  -Trazodone 50mg qhs  -Ativan 0,5mg BID  -Zyprexa 1.25mg q6h PRN for agitation
-aggressive behavior likely not on optimal psychiatric med regimen  -TSH 3.08, folate 13.3, B12 788  -Depakene 375/250  -Trazodone 50mg qhs  -Ativan 0,5mg BID  -Zyprexa 1.25mg q6h PRN for agitation  -will DC on current regimen can titrate VPA in outpt setting. Psych reccomendations included in DC summary.
At the time of H&P, the history gathered seems to suggest patient was on aripiprazole (which he had been taking for over a year), was changed to quetiapine without benztropine, however patient did not tolerate as he seemed to have developed extrapyramidal side effects from antipsychotic treatment. Subsequently patient's antipsychotics discontinued, and at the same time 10/2020, lorazepam was started. Eventually, 2/2021, patient started on valproic acid as well.   -no acute change in psychiatric symptoms, patient's presentation is less concerning for infectious etiology.   -Patient's chronic symptoms would likely be best addressed with more optimal psych/neuro medication regimen.  -psychiatric consult  -f/u TSH, folate, B12
-aggressive behavior likely not on optimal psychiatric med regimen  -TSH 3.08, folate 13.3, B12 788  -Depakene 375/250  -Trazodone 50mg qhs  -Ativan 0,5mg BID  -Zyprexa 1.25mg q6h PRN for agitation
-aggressive behavior likely not on optimal psychiatric med regimen  -TSH 3.08, folate 13.3, B12 788  -Depakene 375/250  -Trazodone 50mg qhs  -Ativan 0,5mg BID  -Zyprexa 1.25mg q6h PRN for agitation  -f/u AM valproate 3/16.

## 2021-03-17 NOTE — PROGRESS NOTE ADULT - PROBLEM SELECTOR PROBLEM 1
Psychiatric disorder

## 2021-03-17 NOTE — PROGRESS NOTE ADULT - REASON FOR ADMISSION
Persistent aggression, Agitation

## 2021-03-17 NOTE — PROGRESS NOTE ADULT - PROBLEM SELECTOR PLAN 4
Diet: C/C diet   DVT ppx: AAT, OOB to chair

## 2021-03-17 NOTE — PROGRESS NOTE ADULT - PROBLEM SELECTOR PLAN 2
h/o PCI w/ stents  -c/w ASA 81mg qD

## 2021-06-19 ENCOUNTER — EMERGENCY (EMERGENCY)
Facility: HOSPITAL | Age: 61
LOS: 1 days | Discharge: HOME | End: 2021-06-19
Attending: EMERGENCY MEDICINE | Admitting: EMERGENCY MEDICINE
Payer: MEDICAID

## 2021-06-19 VITALS
RESPIRATION RATE: 18 BRPM | OXYGEN SATURATION: 98 % | SYSTOLIC BLOOD PRESSURE: 131 MMHG | HEIGHT: 69 IN | HEART RATE: 62 BPM | TEMPERATURE: 98 F | DIASTOLIC BLOOD PRESSURE: 83 MMHG

## 2021-06-19 VITALS
OXYGEN SATURATION: 100 % | DIASTOLIC BLOOD PRESSURE: 88 MMHG | RESPIRATION RATE: 20 BRPM | HEART RATE: 85 BPM | SYSTOLIC BLOOD PRESSURE: 138 MMHG

## 2021-06-19 DIAGNOSIS — F01.51 VASCULAR DEMENTIA, UNSPECIFIED SEVERITY, WITH BEHAVIORAL DISTURBANCE: ICD-10-CM

## 2021-06-19 DIAGNOSIS — Z98.890 OTHER SPECIFIED POSTPROCEDURAL STATES: Chronic | ICD-10-CM

## 2021-06-19 DIAGNOSIS — Z90.49 ACQUIRED ABSENCE OF OTHER SPECIFIED PARTS OF DIGESTIVE TRACT: Chronic | ICD-10-CM

## 2021-06-19 DIAGNOSIS — Z93.3 COLOSTOMY STATUS: Chronic | ICD-10-CM

## 2021-06-19 PROBLEM — E11.9 TYPE 2 DIABETES MELLITUS WITHOUT COMPLICATIONS: Chronic | Status: ACTIVE | Noted: 2021-03-12

## 2021-06-19 PROBLEM — F32.9 MAJOR DEPRESSIVE DISORDER, SINGLE EPISODE, UNSPECIFIED: Chronic | Status: ACTIVE | Noted: 2021-03-12

## 2021-06-19 PROBLEM — M48.02 SPINAL STENOSIS, CERVICAL REGION: Chronic | Status: ACTIVE | Noted: 2021-03-12

## 2021-06-19 PROBLEM — I73.9 PERIPHERAL VASCULAR DISEASE, UNSPECIFIED: Chronic | Status: ACTIVE | Noted: 2021-03-12

## 2021-06-19 PROBLEM — F41.9 ANXIETY DISORDER, UNSPECIFIED: Chronic | Status: ACTIVE | Noted: 2021-03-12

## 2021-06-19 PROBLEM — R15.9 FULL INCONTINENCE OF FECES: Chronic | Status: ACTIVE | Noted: 2021-03-13

## 2021-06-19 PROBLEM — R32 UNSPECIFIED URINARY INCONTINENCE: Chronic | Status: ACTIVE | Noted: 2021-03-13

## 2021-06-19 PROBLEM — G47.00 INSOMNIA, UNSPECIFIED: Chronic | Status: ACTIVE | Noted: 2021-03-12

## 2021-06-19 PROBLEM — I25.10 ATHEROSCLEROTIC HEART DISEASE OF NATIVE CORONARY ARTERY WITHOUT ANGINA PECTORIS: Chronic | Status: ACTIVE | Noted: 2021-03-12

## 2021-06-19 PROBLEM — F31.9 BIPOLAR DISORDER, UNSPECIFIED: Chronic | Status: ACTIVE | Noted: 2021-03-12

## 2021-06-19 PROBLEM — E78.5 HYPERLIPIDEMIA, UNSPECIFIED: Chronic | Status: ACTIVE | Noted: 2021-03-12

## 2021-06-19 LAB
ALBUMIN SERPL ELPH-MCNC: 4.2 G/DL — SIGNIFICANT CHANGE UP (ref 3.3–5)
ALP SERPL-CCNC: 74 U/L — SIGNIFICANT CHANGE UP (ref 40–120)
ALT FLD-CCNC: 10 U/L — SIGNIFICANT CHANGE UP (ref 4–41)
ANION GAP SERPL CALC-SCNC: 15 MMOL/L — HIGH (ref 7–14)
APPEARANCE UR: CLEAR — SIGNIFICANT CHANGE UP
AST SERPL-CCNC: 16 U/L — SIGNIFICANT CHANGE UP (ref 4–40)
BILIRUB SERPL-MCNC: 0.4 MG/DL — SIGNIFICANT CHANGE UP (ref 0.2–1.2)
BILIRUB UR-MCNC: NEGATIVE — SIGNIFICANT CHANGE UP
BUN SERPL-MCNC: 16 MG/DL — SIGNIFICANT CHANGE UP (ref 7–23)
CALCIUM SERPL-MCNC: 10.4 MG/DL — SIGNIFICANT CHANGE UP (ref 8.4–10.5)
CHLORIDE SERPL-SCNC: 105 MMOL/L — SIGNIFICANT CHANGE UP (ref 98–107)
CO2 SERPL-SCNC: 23 MMOL/L — SIGNIFICANT CHANGE UP (ref 22–31)
COLOR SPEC: SIGNIFICANT CHANGE UP
CREAT SERPL-MCNC: 0.79 MG/DL — SIGNIFICANT CHANGE UP (ref 0.5–1.3)
DIFF PNL FLD: ABNORMAL
GLUCOSE SERPL-MCNC: 133 MG/DL — HIGH (ref 70–99)
GLUCOSE UR QL: NEGATIVE — SIGNIFICANT CHANGE UP
HCT VFR BLD CALC: 45.5 % — SIGNIFICANT CHANGE UP (ref 39–50)
HGB BLD-MCNC: 14.3 G/DL — SIGNIFICANT CHANGE UP (ref 13–17)
KETONES UR-MCNC: NEGATIVE — SIGNIFICANT CHANGE UP
LEUKOCYTE ESTERASE UR-ACNC: NEGATIVE — SIGNIFICANT CHANGE UP
MCHC RBC-ENTMCNC: 29.1 PG — SIGNIFICANT CHANGE UP (ref 27–34)
MCHC RBC-ENTMCNC: 31.4 GM/DL — LOW (ref 32–36)
MCV RBC AUTO: 92.5 FL — SIGNIFICANT CHANGE UP (ref 80–100)
NITRITE UR-MCNC: NEGATIVE — SIGNIFICANT CHANGE UP
NRBC # BLD: 0 /100 WBCS — SIGNIFICANT CHANGE UP
NRBC # FLD: 0 K/UL — SIGNIFICANT CHANGE UP
PH UR: 6.5 — SIGNIFICANT CHANGE UP (ref 5–8)
PLATELET # BLD AUTO: 255 K/UL — SIGNIFICANT CHANGE UP (ref 150–400)
POTASSIUM SERPL-MCNC: 5.2 MMOL/L — SIGNIFICANT CHANGE UP (ref 3.5–5.3)
POTASSIUM SERPL-SCNC: 5.2 MMOL/L — SIGNIFICANT CHANGE UP (ref 3.5–5.3)
PROT SERPL-MCNC: 8.6 G/DL — HIGH (ref 6–8.3)
PROT UR-MCNC: NEGATIVE — SIGNIFICANT CHANGE UP
RBC # BLD: 4.92 M/UL — SIGNIFICANT CHANGE UP (ref 4.2–5.8)
RBC # FLD: 15.4 % — HIGH (ref 10.3–14.5)
SARS-COV-2 RNA SPEC QL NAA+PROBE: SIGNIFICANT CHANGE UP
SODIUM SERPL-SCNC: 143 MMOL/L — SIGNIFICANT CHANGE UP (ref 135–145)
SP GR SPEC: 1.01 — SIGNIFICANT CHANGE UP (ref 1.01–1.02)
UROBILINOGEN FLD QL: SIGNIFICANT CHANGE UP
VALPROATE SERPL-MCNC: 36.6 UG/ML — LOW (ref 50–100)
WBC # BLD: 13.26 K/UL — HIGH (ref 3.8–10.5)
WBC # FLD AUTO: 13.26 K/UL — HIGH (ref 3.8–10.5)

## 2021-06-19 PROCEDURE — 99284 EMERGENCY DEPT VISIT MOD MDM: CPT

## 2021-06-19 RX ORDER — OLANZAPINE 15 MG/1
5 TABLET, FILM COATED ORAL ONCE
Refills: 0 | Status: COMPLETED | OUTPATIENT
Start: 2021-06-19 | End: 2021-06-19

## 2021-06-19 RX ORDER — OLANZAPINE 15 MG/1
2.5 TABLET, FILM COATED ORAL ONCE
Refills: 0 | Status: COMPLETED | OUTPATIENT
Start: 2021-06-19 | End: 2021-06-19

## 2021-06-19 RX ORDER — OLANZAPINE 15 MG/1
5 TABLET, FILM COATED ORAL ONCE
Refills: 0 | Status: DISCONTINUED | OUTPATIENT
Start: 2021-06-19 | End: 2021-06-19

## 2021-06-19 RX ADMIN — OLANZAPINE 2.5 MILLIGRAM(S): 15 TABLET, FILM COATED ORAL at 14:30

## 2021-06-19 RX ADMIN — OLANZAPINE 2.5 MILLIGRAM(S): 15 TABLET, FILM COATED ORAL at 14:45

## 2021-06-19 RX ADMIN — OLANZAPINE 5 MILLIGRAM(S): 15 TABLET, FILM COATED ORAL at 21:18

## 2021-06-19 NOTE — ED BEHAVIORAL HEALTH ASSESSMENT NOTE - PAST PSYCHOTROPIC MEDICATION
Problem: Pain  Goal: #Acceptable pain level achieved/maintained at rest using NRS/Faces  This goal is used for patients who can self-report.  Acceptable means the level is at or below the identified comfort/function goal.  Outcome: Outcome Not Met, Continue to Monitor   11/20/18 0038   Pain Evaluation at Rest   Patient's Comfort/Function (Pain) GOAL At Rest 6   Comfort/Function (Pain) SCORE at Rest 9   Pain Evaluation at Rest Pain level unacceptable - administer scheduled/PRN intervention   Utilizing PRN Morphine and Percocet.     Problem: Diabetes  Goal: Glycemic balance achieved/maintained  Goal is to maintain blood sugar within range with no episodes of hypoglycemia  Outcome: Outcome Not Met, Continue to Monitor  Insulin drip off now. Started on D5.45 NS.    Problem: VTE, Risk for  Intervention: # Maintain Mechanical VTE Prophylaxis at all times   11/20/18 0038   OTHER   VTE Mechanical Prophylaxis Devices (in use) RLE Intermittent Sequential Compression Device (SCDs);LLE Intermittent Sequential Compression Device (SCDs)   Refused SCD      Problem: At Risk for Falls  Intervention: Risk for Fall Interventions (specify)  Selectively initiate interventions based on patient-specific fall risks. Document in  Associated Rows on Daily Cares.   11/20/18 0038   Safety Measures   Environmental Safety Measures Maintained Done   Patient Specific Safety Measures in Use Encourage personal sensory support item use;Safe lighting as appropriate   Risk for Falls Interventions   Patient's Personal Risk Factors History of falling ([place]);Problems with walking or moving;Taking medications that cause drowsiness/problems walking;Potential for overestimating ability   Mobility and Gait Measures Collaborate with PT;Encourage personal mobility support item use;Mobilize (Early and progressive ambulation unless contraindicated);Use gait belt   Altered Mental Status/Unable to Participate Measures Bed/chair exit alert;Monitor for needs  frequently;Use low height bed;Minimize distractions during ambulation;Hourly or more frequent monitoring   OTHER   Elimination Risk Interventions Use bedside commode if urgency or unable to ambulate;Offer toileting with every interaction (patient able to identify need)            Abilify, Cogentin

## 2021-06-19 NOTE — ED PROVIDER NOTE - PMH
Anxiety    Anxiety and depression    Bipolar disorder    Bowel incontinence    CAD (coronary artery disease)  "Coronary artery stents ? 7(no card/no datails )? 2007, placed all together"  Cervical spinal stenosis    Coronary artery disease  s/p PCI  Diverticulitis  s/p colostomy /colon RSx  DM (diabetes mellitus)  2  HLD (hyperlipidemia)    Hyperlipidemia    Insomnia    Insomnia    Major depressive disorder, remission status unspecified, unspecified whether recurrent    Peripheral vascular disease    PVD (peripheral vascular disease)  with leg pain   on pletal  Type 2 diabetes mellitus without complication, unspecified whether long term insulin use    Urinary incontinence    Vascular dementia with behavior disturbance

## 2021-06-19 NOTE — ED ADULT NURSE NOTE - OBJECTIVE STATEMENT
Pt received to room 15. Pt sent to ED from nursing home for aggression. As per triage note, pt has been physically aggressive at his nursing home and is unable to take care of himself. Pt saturated with urine and dried feces upon arrival. Blanchable redness on entire buttock and scrotum. "Scratch-like" marks noted to his buttock. Pt was physically and verbally aggressive upon arrival. Secuirty called to bedside. Pt cleaned head to toe of urine and feces. New sheets applied to stretcher, clean gown applied. Warm blankets provided to patient. Pt is unwilling to answer questions. Respirations appear even & unlabored. Pt pulled to hallway spot 18A in front of RN desk for close observation.

## 2021-06-19 NOTE — ED ADULT NURSE REASSESSMENT NOTE - NS ED NURSE REASSESS COMMENT FT1
Pt moved to room 15 for privacy. Pt straight cathed for urine using sterile technique. 3 RN's at bedside as well as 4 security staff. Pt was physically aggressive but calmed down once straight cath & IV placement was concluded. Pt's diaper was soiled, pt cleaned of urine. Bed sheets changed, clean diaper and hospital gown applied. Pt moved back to spot 18A by RN station for close observation. Pt moved to room 15 for privacy. Pt straight cathed for urine using sterile technique. 300ml of clear yellow urine drained from bladder. 3 RN's at bedside as well as 4 security staff. Pt was physically aggressive but calmed down once straight cath & IV placement was concluded. Pt's diaper was soiled, pt cleaned of urine. Bed sheets changed, clean diaper and hospital gown applied. Pt moved back to spot 18A by RN station for close observation.

## 2021-06-19 NOTE — ED PROVIDER NOTE - CCCP TRG CHIEF CMPLNT
Is This A New Presentation, Or A Follow-Up?: Skin Lesions How Severe Is Your Skin Lesion?: mild Have Your Skin Lesions Been Treated?: not been treated has been bed bound greater than a month and has been " combative" as per complaint from nursing home/aggressive behavior

## 2021-06-19 NOTE — ED BEHAVIORAL HEALTH ASSESSMENT NOTE - SUMMARY
62 yo M with Vascular Dementia with Behavioral Disturbances x years, DNR, ex-wife if Patient's HCP, with charted hx of Patient not wanting to return to his current nursing home due to conflict with some staff members, whose behavior is suspected to be partially volitional and intentional.   - Of note, Patient was sent to [facility] today in part for "refusing care" & "unable to properly care for him at facility." If Patient lacks capacity (which can be determined by any licensed physician at the nursing home) then he cannot refuse care and his decision maker is his designated Health Care Proxy.   - Moreover, facility administration has to follow specific [place] rules regarding "evicting" such patients from these facilities and coordinating with Patient / family into transitioning to another facility. Patient's insurance / money is used for his facility stay and thus he has a certain contractual agreement with his residence which affords him such procedural rights

## 2021-06-19 NOTE — ED BEHAVIORAL HEALTH ASSESSMENT NOTE - PSYCHIATRIC ISSUES AND PLAN (INCLUDE STANDING AND PRN MEDICATION)
would get valproic acid level; would use Zyprexa 5mg IM as PRN as lower dose will highly likely be ineffective as Patient is not antipsychotic naive

## 2021-06-19 NOTE — PROVIDER CONTACT NOTE (OTHER) - ASSESSMENT
Wilfredo Begum) referred this case pt has been medically cleared for discharge and needs ride [facility]. Writer [place] [facility] [place] spoke with Supervisor Ms. Kelsi aden informed pt has [facility]. [place] [facility] [place] up [place] trip # [id number] and ETA 9:30 pm. After this Writer contacted [facility] [phone] and spoke with [facility] and reservation # [phone] and writer informed [facility] will  the pt at 9 :30 and informed Donaldo to make a note so the dispatch team could call senior care. Non Emergent ambulance form has been filled out by MD and has been attached to the pts envelope for EMS. medical team informed they have already  contacted [facility]  and discussed  about pt's D/C plan.  Writer informed the medical team, pt was made aware of the ETA and in agreement. [facility] Address: [phone] [place] [address] Phone: [phone].

## 2021-06-19 NOTE — ED PROVIDER NOTE - CLINICAL SUMMARY MEDICAL DECISION MAKING FREE TEXT BOX
60 yo M with a PMHx of bipolar do c/b psychosis, MDD, vascular dementia, CAD s/p PCI, T2DM sent from [facility] for aggressive behavior 62 yo M with a PMHx of bipolar do c/b psychosis, MDD, vascular dementia, CAD s/p PCI, T2DM sent from [facility] for aggressive behavior. Basics. IM zyprexa 2.5mg PRN agitation Q6.  consult. 62 yo M with a PMHx of bipolar do c/b psychosis, MDD, vascular dementia, CAD s/p PCI, T2DM sent from [facility] for aggressive behavior. Basics. UA. IM zyprexa 2.5mg PRN agitation Q6.  consult.

## 2021-06-19 NOTE — ED BEHAVIORAL HEALTH ASSESSMENT NOTE - DESCRIPTION
see HPI Seroquel - EPS calm in triage then became agitated, cursing at staff as they tried to tend to his needs and was given Zyprexa 5mg IM by EM Team

## 2021-06-19 NOTE — ED PROVIDER NOTE - FAMILY HISTORY
Patient unable to provide medical history, ~ due to mental state     Father  Still living? No  Family history of breast cancer in mother, Age at diagnosis: 61-70     Mother  Still living? No  Family history of breast cancer in mother, Age at diagnosis: 51-60

## 2021-06-19 NOTE — ED ADULT NURSE REASSESSMENT NOTE - NS ED NURSE REASSESS COMMENT FT1
Pt cleaned of urine, new diaper applied. Pt became agitated & aggressive towards staff. Dr. Gray made aware. New orders placed and administered. [facility] at bedside, preparing to transport pt back to his nursing home.

## 2021-06-19 NOTE — ED PROVIDER NOTE - ATTENDING CONTRIBUTION TO CARE
DR. BLOCH, ATTENDING MD-  I performed a face to face bedside interview with patient regarding history of present illness, review of symptoms and past medical history. I completed an independent physical exam.  I have discussed patient's plan of care with the resident.   Thin agitated, punching, covered in urine and feces, perrl, moving all extrem, tremor, flat faciesheart s1s2, lungs clear, abd soft nontender.

## 2021-06-19 NOTE — ED PROVIDER NOTE - OBJECTIVE STATEMENT
60 yo M with a PMHx of bipolar do c/b psychosis, MDD, vascular dementia, CAD s/p PCI, T2DM sent from [facility] for aggressive behavior. He had a recent admission in March for the same. Currently, he is not agitated, but only saying ok. Not answering questions any further.

## 2021-06-19 NOTE — ED BEHAVIORAL HEALTH ASSESSMENT NOTE - HPI (INCLUDE ILLNESS QUALITY, SEVERITY, DURATION, TIMING, CONTEXT, MODIFYING FACTORS, ASSOCIATED SIGNS AND SYMPTOMS)
61M, , single, , noncaregiver, disabled, domiciled at [facility] in [place] staff, Ex-Wife Mrs. Abbasi is Pt's Health Care Proxy, charted diagnosis of Vascular Dementia with Behavioral Disturbances, + DNR (MOLST form sent over from nursing home), last seen by [facility] Psychiatry on 3/15/21, currently on Depakene syrup 500mg PO bid, Ativan 1mg PO TID, trazodone 50mg PO qhs, with LONG history of acting out behaviors including physically lashing out at staff, cursing, yelling, who was sent over from nursing home today for physical aggression - kicking staff, and "covered in feces and urine, flies hovering over + around him + spilling into hallway."      3/17/21 Medical Team SW note "Per medical team 8,patient is reporting he does not want to return to facility. SW met with patient at bedside. Patient stated he does not want to return to facility due to an argument he had with a nurse at the facility. Patient spoke with HCP/ex-wife Beronica Wilson P:[phone] and is in agreement to return to [facility]. Patient's HCP reports she will speak with  facility in regards to argument patient had with nurse at facility."    ISTOP Reference #:[id number]   06/14/2021 06/14/2021 lorazepam 1 mg tablet  90 30 Cuate Bailon [id number] [employer]   05/19/2021 05/19/2021 lorazepam 1 mg tablet  60 30 AnjelicaSixto [id number] [employer] [facility] [facility]   05/04/2021 05/07/2021 lorazepam 0.5 mg tablet  60 30 Anjelica, Sixto [id number] [employer]   04/01/2021 04/01/2021 lorazepam 0.5 mg tablet  60 30 Anjelica, Sixto [id number] [employer]   02/19/2021 02/22/2021 lorazepam 0.5 mg tablet  60 30 Anjelica, Sixto [id number] [employer]   01/27/2021 01/28/2021 lorazepam 0.5 mg tablet  60 30 AnjelicaSixto [id number] [employer]   12/27/2020 12/29/2020 lorazepam 0.5 mg tablet  60 30 Sixto Dejesus [id number] [employer]

## 2021-06-19 NOTE — ED ADULT TRIAGE NOTE - CHIEF COMPLAINT QUOTE
brought [facility] as per EMS pt is covered in feces and had flies in room pt arrives disheveled and smelling of urine with urine stains on sheets pt appears calm at triage time

## 2021-06-19 NOTE — ED BEHAVIORAL HEALTH ASSESSMENT NOTE - RISK ASSESSMENT
Low Acute Suicide Risk Chronic risk factors: male gender, single,  chronic medical issues impacting daily quality of life resulting in need for nursing home placement. Protective factors: age < 65yrs; no known suicide attempts; no self-injurious behavior; no legal issues; no access to guns; no substance use; has family support; access to health services. No acute risk factors identified

## 2021-06-19 NOTE — ED ADULT NURSE NOTE - NSIMPLEMENTINTERV_GEN_ALL_ED
Implemented All Fall Risk Interventions:  [place] to call system. Call bell, personal items and telephone within reach. Instruct patient to call for assistance. Room bathroom lighting operational. Non-slip footwear when patient is off stretcher. Physically safe environment: no spills, clutter or unnecessary equipment. Stretcher in lowest position, wheels locked, appropriate side rails in place. Provide visual cue, wrist band, yellow gown, etc. Monitor gait and stability. Monitor for mental status changes and reorient to person, place, and time. Review medications for side effects contributing to fall risk. Reinforce activity limits and safety measures with patient and family.

## 2021-06-19 NOTE — ED PROVIDER NOTE - NSFOLLOWUPINSTRUCTIONS_ED_ALL_ED_FT
You were brought to the hospital due to severe agitation and violence towards the nursing home staff. Here in the hospital, infectious and metabolic causes for this were ruled out. You were treated with a total of 5mg of zyprexa per psychiatry. At the nursing home, you may receive 5mg intramuscular zyprexa as needed for agitation every 6 hours. Please continue the rest of your medications as prescribed. Please come back to the ED if severe agitation or violence. Please followup with your psychiatrist and PCP within 1 week.

## 2021-06-19 NOTE — ED BEHAVIORAL HEALTH ASSESSMENT NOTE - OTHER PAST PSYCHIATRIC HISTORY (INCLUDE DETAILS REGARDING ONSET, COURSE OF ILLNESS, INPATIENT/OUTPATIENT TREATMENT)
Patient arrived to NH about 2 years ago previously on Abilify and Cogentin which were discontinued 10/2020 for unclear reasons. As per charts patient was given Seroquel was for about a week in 10/2020 and subsequently developed protruding tongue movements, so discontinued and Ativan started for mood control along with VPA now on Depakene syrup 375mg PO daily and 250mg PO at night. Since approximately 10/2020 behavior and aggression has worsened

## 2021-06-19 NOTE — ED ADULT TRIAGE NOTE - CCCP TRG CHIEF CMPLNT
has been bed bound greater than a month and has been " combative" as per complaint from nursing home/aggressive behavior

## 2021-06-19 NOTE — ED PROVIDER NOTE - PSH
History of coronary angiogram  multiple coronary artery stents ?7- placed  not sure about the details or stent card  History of laminectomy    Status post colon resection  04/2016  right colostomy & left PEG feeding tube in place(not using now)  Status post colostomy

## 2021-06-19 NOTE — ED PROVIDER NOTE - PATIENT PORTAL LINK FT
You can access the FollowMyHealth Patient Portal offered by [facility] by registering at the following website: [email or web address]. By joining [facility]’s FollowMyHealth portal, you will also be able to view your health information using other applications (apps) compatible with our system.

## 2021-06-19 NOTE — ED BEHAVIORAL HEALTH ASSESSMENT NOTE - OTHER
unable to ascertain at this time due to Pt's lack of cooperation intermittent limited deferred at this time some facial asymmetry Vascular Dementia [facility] in Located within [facility] peers

## 2025-02-07 NOTE — BH CONSULTATION LIAISON ASSESSMENT NOTE - HPI (INCLUDE ILLNESS QUALITY, SEVERITY, DURATION, TIMING, CONTEXT, MODIFYING FACTORS, ASSOCIATED SIGNS AND SYMPTOMS)
61M domiciled at NH w/ PMHx of CAD s/p PCI, DMII w/ PPHx of bipolar disorder, MDD, vascular dementia, psychosis, and episodes of aggressive behavior as per NH staff and has been worsening while assisting patient with ADLs. Behaviors such as aggressive biting, pushing staff, bending staff hands/fingers. Patient arrived to NH about 2 years ago previously on Abilify and Cogentin which were discontinued 10/2020 for unclear reasons. As per charts patient was given Seroquel was for about a week in 10/2020 and subsequently developed protruding tongue movements, so discontinued and Ativan started for mood control along with VPA now on Depakene syrup 375mg PO daily and 250mg PO at night. Since approximately 10/2020 behavior and aggression has worsened and becoming more persistent. Psychiatry consulted for agitation.    In the ED, patient required Zyprexa 5mg for COVID swab and was not cooperating with straight catheterization subsequently given Midazolam 4mg to proceed w/ catheterization.    Chart reviewed and patient seen at bedside AO to self only appearing very labile emotionally, tearful, perseversative on not feeling well without being able to verbalize what is bothering him repeating "I don't know what's wrong" and continues to moan and cry. During interview patient mentions he hasn't seen his wife, but did not elaborate further and just cried more and said "I don't know." DISCHARGE